# Patient Record
Sex: FEMALE | Race: WHITE | NOT HISPANIC OR LATINO | Employment: UNEMPLOYED | URBAN - METROPOLITAN AREA
[De-identification: names, ages, dates, MRNs, and addresses within clinical notes are randomized per-mention and may not be internally consistent; named-entity substitution may affect disease eponyms.]

---

## 2017-02-15 ENCOUNTER — TRANSFERRED RECORDS (OUTPATIENT)
Dept: HEALTH INFORMATION MANAGEMENT | Facility: CLINIC | Age: 52
End: 2017-02-15

## 2017-03-07 ENCOUNTER — ALLIED HEALTH/NURSE VISIT (OUTPATIENT)
Dept: FAMILY MEDICINE | Facility: CLINIC | Age: 52
End: 2017-03-07
Payer: MEDICARE

## 2017-03-07 ENCOUNTER — TELEPHONE (OUTPATIENT)
Dept: FAMILY MEDICINE | Facility: CLINIC | Age: 52
End: 2017-03-07

## 2017-03-07 DIAGNOSIS — Z23 NEED FOR PROPHYLACTIC VACCINATION AND INOCULATION AGAINST INFLUENZA: Primary | ICD-10-CM

## 2017-03-07 PROCEDURE — G0008 ADMIN INFLUENZA VIRUS VAC: HCPCS

## 2017-03-07 PROCEDURE — 99207 ZZC NO CHARGE NURSE ONLY: CPT

## 2017-03-07 PROCEDURE — 90686 IIV4 VACC NO PRSV 0.5 ML IM: CPT

## 2017-03-07 NOTE — MR AVS SNAPSHOT
"              After Visit Summary   3/7/2017    Malinda Walker    MRN: [id number]           Patient Information     Date Of Birth          1965        Visit Information        Provider Department      3/7/2017 4:15 PM [place]  CMA/LPN [facility]        Today's Diagnoses     Need for prophylactic vaccination and inoculation against influenza    -  1       Follow-ups after your visit        Your next 10 appointments already scheduled     Mar 07, 2017  4:15 PM CST   Nurse Only with [facility] C[facility]/LPN   [facility]    [address] [address] [phone]              Who to contact     Normal or non-critical lab and imaging results will be communicated to you by MyChart, letter or phone within 4 business days after the clinic has received the results. If you do not hear from us within 7 days, please contact the clinic through MyChart or phone. If you have a critical or abnormal lab result, we will notify you by phone as soon as possible.  Submit refill requests through My Health Direct or call your pharmacy and they will forward the refill request to us. Please allow 3 business days for your refill to be completed.          If you need to speak with a  for additional information , please call: [phone]             Additional Information About Your Visit        My Health Direct Information     My Health Direct lets you send messages to your doctor, view your test results, renew your prescriptions, schedule appointments and more. To sign up, go to [email or web address] . Click on \"Log in\" on the left side of the screen, which will take you to the Welcome page. Then click on \"Sign up Now\" on the right side of the page.     You will be asked to enter the access code listed below, as well as some personal information. Please follow the directions to create your username and password.     Your access code is: [email or web address]  Expires: 6/5/2017  4:14 PM     Your access " code will  in 90 days. If you need help or a new code, please call your [place] clinic or [phone].        Care EveryWhere ID     This is your Care EveryWhere ID. This could be used by other organizations to access your [place] medical records  [id number]         Blood Pressure from Last 3 Encounters:   10/27/16 116/78   16 131/72   16 122/76    Weight from Last 3 Encounters:   10/27/16 87 lb (39.5 kg)   16 87 lb 1.6 oz (39.5 kg)   16 86 lb 8 oz (39.2 kg)              We Performed the Following     ADMIN INFLUENZA[] (For MEDICARE Patients ONLY)      FLU VAC, SPLIT VIRUS IM > 3 YO (QUADRIVALENT) [[id number]]        Primary Care Provider Office Phone # Fax #    Salvador Gabriel [phone]       [facility] [address] [facility] [address]        Thank you!     Thank you for choosing [facility]  for your care. Our goal is always to provide you with excellent care. Hearing back from our patients is one way we can continue to improve our services. Please take a few minutes to complete the written survey that you may receive in the mail after your visit with us. Thank you!             Your Updated Medication List - Protect others around you: Learn how to safely use, store and throw away your medicines at www.disposemymeds.org.      Notice  As of 3/7/2017  4:14 PM    You have not been prescribed any medications.

## 2017-03-07 NOTE — PROGRESS NOTES
Injectable Influenza Immunization Documentation    1.  Is the person to be vaccinated sick today?  No    2. Does the person to be vaccinated have an allergy to eggs or to a component of the vaccine?  No    3. Has the person to be vaccinated today ever had a serious reaction to influenza vaccine in the past?  No    4. Has the person to be vaccinated ever had Guillain-Edgemoor syndrome?  No     Form completed by Agnes Espino CMA

## 2017-05-24 ENCOUNTER — TELEPHONE (OUTPATIENT)
Dept: FAMILY MEDICINE | Facility: CLINIC | Age: 52
End: 2017-05-24

## 2017-05-24 NOTE — TELEPHONE ENCOUNTER
Called June Juares to discuss colon cancer screening with FIT test, it was discussed that colonoscopy was not desired, I assured her that colonoscopy would not be necessary, but she would receive a letter in the mail with the FIT test with an explanation of how to send a stool sample back to clinic.    Patient seen and note scribed by Washington Castrejon MS3

## 2017-05-25 ENCOUNTER — TELEPHONE (OUTPATIENT)
Dept: FAMILY MEDICINE | Facility: CLINIC | Age: 52
End: 2017-05-25

## 2017-05-25 DIAGNOSIS — Z12.11 SPECIAL SCREENING FOR MALIGNANT NEOPLASMS, COLON: Primary | ICD-10-CM

## 2017-07-15 ENCOUNTER — HEALTH MAINTENANCE LETTER (OUTPATIENT)
Age: 52
End: 2017-07-15

## 2017-08-04 ENCOUNTER — OFFICE VISIT (OUTPATIENT)
Dept: FAMILY MEDICINE | Facility: CLINIC | Age: 52
End: 2017-08-04
Payer: MEDICARE

## 2017-08-04 VITALS
HEART RATE: 85 BPM | HEIGHT: 55 IN | SYSTOLIC BLOOD PRESSURE: 119 MMHG | BODY MASS INDEX: 17.4 KG/M2 | TEMPERATURE: 97.2 F | WEIGHT: 75.2 LBS | DIASTOLIC BLOOD PRESSURE: 74 MMHG

## 2017-08-04 DIAGNOSIS — R63.4 LOSS OF WEIGHT: ICD-10-CM

## 2017-08-04 DIAGNOSIS — D64.9 ANEMIA, UNSPECIFIED TYPE: ICD-10-CM

## 2017-08-04 DIAGNOSIS — R63.6 LOW WEIGHT FOR HEIGHT: ICD-10-CM

## 2017-08-04 DIAGNOSIS — Q90.9 DOWN'S SYNDROME: Primary | ICD-10-CM

## 2017-08-04 LAB
ALBUMIN SERPL-MCNC: 3.5 G/DL (ref 3.4–5)
ALP SERPL-CCNC: 82 U/L (ref 40–150)
ALT SERPL W P-5'-P-CCNC: 24 U/L (ref 0–50)
ANION GAP SERPL CALCULATED.3IONS-SCNC: 5 MMOL/L (ref 3–14)
AST SERPL W P-5'-P-CCNC: 36 U/L (ref 0–45)
BASOPHILS # BLD AUTO: 0 10E9/L (ref 0–0.2)
BASOPHILS NFR BLD AUTO: 0.5 %
BILIRUB SERPL-MCNC: 0.3 MG/DL (ref 0.2–1.3)
BUN SERPL-MCNC: 17 MG/DL (ref 7–30)
CALCIUM SERPL-MCNC: 8.8 MG/DL (ref 8.5–10.1)
CHLORIDE SERPL-SCNC: 103 MMOL/L (ref 94–109)
CO2 SERPL-SCNC: 29 MMOL/L (ref 20–32)
CREAT SERPL-MCNC: 0.71 MG/DL (ref 0.52–1.04)
DIFFERENTIAL METHOD BLD: NORMAL
EOSINOPHIL # BLD AUTO: 0.1 10E9/L (ref 0–0.7)
EOSINOPHIL NFR BLD AUTO: 1.2 %
ERYTHROCYTE [DISTWIDTH] IN BLOOD BY AUTOMATED COUNT: 14.2 % (ref 10–15)
GFR SERPL CREATININE-BSD FRML MDRD: 87 ML/MIN/1.7M2
GLUCOSE SERPL-MCNC: 77 MG/DL (ref 70–99)
HCT VFR BLD AUTO: 42.4 % (ref 35–47)
HGB BLD-MCNC: 14.2 G/DL (ref 11.7–15.7)
IRON SATN MFR SERPL: 20 % (ref 15–46)
IRON SERPL-MCNC: 40 UG/DL (ref 35–180)
LYMPHOCYTES # BLD AUTO: 1.4 10E9/L (ref 0.8–5.3)
LYMPHOCYTES NFR BLD AUTO: 16.6 %
MCH RBC QN AUTO: 32.6 PG (ref 26.5–33)
MCHC RBC AUTO-ENTMCNC: 33.5 G/DL (ref 31.5–36.5)
MCV RBC AUTO: 97 FL (ref 78–100)
MONOCYTES # BLD AUTO: 0.5 10E9/L (ref 0–1.3)
MONOCYTES NFR BLD AUTO: 6.1 %
NEUTROPHILS # BLD AUTO: 6.3 10E9/L (ref 1.6–8.3)
NEUTROPHILS NFR BLD AUTO: 75.6 %
PLATELET # BLD AUTO: 238 10E9/L (ref 150–450)
POTASSIUM SERPL-SCNC: 4.1 MMOL/L (ref 3.4–5.3)
PROT SERPL-MCNC: 7.9 G/DL (ref 6.8–8.8)
RBC # BLD AUTO: 4.36 10E12/L (ref 3.8–5.2)
SODIUM SERPL-SCNC: 137 MMOL/L (ref 133–144)
TIBC SERPL-MCNC: 201 UG/DL (ref 240–430)
TSH SERPL DL<=0.005 MIU/L-ACNC: 2.23 MU/L (ref 0.4–4)
WBC # BLD AUTO: 8.4 10E9/L (ref 4–11)

## 2017-08-04 PROCEDURE — 80053 COMPREHEN METABOLIC PANEL: CPT | Performed by: FAMILY MEDICINE

## 2017-08-04 PROCEDURE — 83540 ASSAY OF IRON: CPT | Performed by: FAMILY MEDICINE

## 2017-08-04 PROCEDURE — 83550 IRON BINDING TEST: CPT | Performed by: FAMILY MEDICINE

## 2017-08-04 PROCEDURE — 00000402 ZZHCL STATISTIC TOTAL PROTEIN: Performed by: FAMILY MEDICINE

## 2017-08-04 PROCEDURE — 84443 ASSAY THYROID STIM HORMONE: CPT | Performed by: FAMILY MEDICINE

## 2017-08-04 PROCEDURE — 99213 OFFICE O/P EST LOW 20 MIN: CPT | Performed by: FAMILY MEDICINE

## 2017-08-04 PROCEDURE — 80050 GENERAL HEALTH PANEL: CPT | Performed by: FAMILY MEDICINE

## 2017-08-04 PROCEDURE — 84165 PROTEIN E-PHORESIS SERUM: CPT | Performed by: FAMILY MEDICINE

## 2017-08-04 PROCEDURE — 36415 COLL VENOUS BLD VENIPUNCTURE: CPT | Performed by: FAMILY MEDICINE

## 2017-08-04 NOTE — NURSING NOTE
"Chief Complaint   Patient presents with     Weight Loss       Initial /74 (BP Location: Right arm, Patient Position: Sitting, Cuff Size: Adult Small)  Pulse 85  Temp 97.2  F (36.2  C) (Tympanic)  Ht 4' 4\" (1.321 m)  Wt 75 lb 3.2 oz (34.1 kg)  BMI 19.55 kg/m2 Estimated body mass index is 19.55 kg/(m^2) as calculated from the following:    Height as of this encounter: 4' 4\" (1.321 m).    Weight as of this encounter: 75 lb 3.2 oz (34.1 kg).  Medication Reconciliation: complete   Sigrid Lees LPN    "

## 2017-08-04 NOTE — PROGRESS NOTES
SUBJECTIVE:                                                    Malinda Walker is a 51 year old female who presents to clinic today for the following health issues:    Has been loosing weight since she had pneumonia this spring.  Foster mom reports she does not eat her luch all the time.  Eats breakfast and dinner and bedtime snack well.  She has cut out dressing and kepchup with high fructose syrup in theoir diet.      Has not niticed an y cahnge in apitie or energy,  But according to scale wweight is decreasing  Review of chart shows low albumin when she was hospitalized.    She has also has anemia.    She is missing 3 teeth  Upper left.  Does not eat much crispy foods.    No constipation has daily loose stool.  No change no bloating.  Happy no change in behaviors.   Enjoys outings,   Not more sob, no decrease in endurance.       Wt Readings from Last 10 Encounters:   08/04/17 75 lb 3.2 oz (34.1 kg)   10/27/16 87 lb (39.5 kg)   08/29/16 87 lb 1.6 oz (39.5 kg)   04/28/16 86 lb 8 oz (39.2 kg)   03/30/16 87 lb 12.8 oz (39.8 kg)   03/23/16 100 lb 8.5 oz (45.6 kg)   08/25/15 94 lb (42.6 kg)   08/14/14 93 lb 8 oz (42.4 kg)   08/13/13 93 lb (42.2 kg)   08/01/12 97 lb 8 oz (44.2 kg)       Problem list and histories reviewed & adjusted, as indicated.  Additional history: as documented    Patient Active Problem List   Diagnosis     Down's syndrome     CARDIOVASCULAR SCREENING; LDL GOAL LESS THAN 160     Health Care Home     Routine general medical examination at a health care facility     Physician defers pap smear of cervix     Pneumonia     Acute hypoxemic respiratory failure (H)     Past Surgical History:   Procedure Laterality Date     NO HISTORY OF SURGERY         Social History   Substance Use Topics     Smoking status: Never Smoker     Smokeless tobacco: Never Used     Alcohol use No     History reviewed. No pertinent family history.          Reviewed and updated as needed this visit by clinical staff      "  Reviewed and updated as needed this visit by Provider         ROS: limted by cognitive and communcation delay  Constitutional, HEENT, cardiovascular, pulmonary, gi and gu systems are negative, except as otherwise noted.      OBJECTIVE:   /74 (BP Location: Right arm, Patient Position: Sitting, Cuff Size: Adult Small)  Pulse 85  Temp 97.2  F (36.2  C) (Tympanic)  Ht 4' 4\" (1.321 m)  Wt 75 lb 3.2 oz (34.1 kg)  BMI 19.55 kg/m2  Body mass index is 19.55 kg/(m^2).  GENERAL: healthy, alert and no distress  NECK: no adenopathy, no asymmetry, masses, or scars and thyroid normal to palpation  RESP: lungs clear to auscultation - no rales, rhonchi or wheezes  CV: regular rate and rhythm, normal S1 S2, no S3 or S4, no murmur, click or rub, no peripheral edema and peripheral pulses strong  ABDOMEN: soft, nontender, no hepatosplenomegaly, no masses and bowel sounds normal  MS: no gross musculoskeletal defects noted, no edema    Diagnostic Test Results:  none     ASSESSMENT/PLAN:       1. Down's syndrome - Doing well        2. Low weight for height    - CBC with platelets differential  - Comprehensive metabolic panel (BMP + Alb, Alk Phos, ALT, AST, Total. Bili, TP)  - Protein electrophoresis    3. Anemia, unspecified type    - Iron and iron binding capacity     4. Loss of weight  - discussed with foster mom.  She states Malinda like scrambled eggs and cheese not as much meats.  Like peanut butter, favorite food is any type of candy    She will offer more eggs and cheese and monitor percent of meals eaten.  She will also ask \"work\"  To monitor how much of her lunch she eats.     - TSH with free T4 reflex    Work on weight loss, monitor inake   recheck weight  Weight management plan pre-albumin level obtained, nutritionist consultation and return visit in 1 Month    Salvador Gabriel MD  [facility]"

## 2017-08-07 LAB
ALBUMIN SERPL ELPH-MCNC: 4 G/DL (ref 3.7–5.1)
ALPHA1 GLOB SERPL ELPH-MCNC: 0.3 G/DL (ref 0.2–0.4)
ALPHA2 GLOB SERPL ELPH-MCNC: 0.8 G/DL (ref 0.5–0.9)
B-GLOBULIN SERPL ELPH-MCNC: 0.7 G/DL (ref 0.6–1)
GAMMA GLOB SERPL ELPH-MCNC: 1.7 G/DL (ref 0.7–1.6)
M PROTEIN SERPL ELPH-MCNC: 0 G/DL
PROT PATTERN SERPL ELPH-IMP: ABNORMAL

## 2017-08-09 ENCOUNTER — CARE COORDINATION (OUTPATIENT)
Dept: CARE COORDINATION | Facility: CLINIC | Age: 52
End: 2017-08-09

## 2017-08-09 NOTE — PROGRESS NOTES
Clinic Care Coordination Contact  OUTREACH    Referral Information:  Referral Source: Care Team  Reason for Contact: Jaye Cowan, [facility], [phone], called the clinic and requested pt's most recent office visit notes.  This writer became involved when there was no Consent to Communicate or Legal documentation on file for the the pt.      [place] Utilization:   ED Visits in last year: 0  Hospital visits in last year: 0  Last PCP appointment: 08/04/17  Missed Appointments: 0  Concerns: yes  Multiple Providers or Specialists: no    Clinical Concerns:  Current Medical Concerns: Pt with 12 pound weight loss.  Jaye to call MD Gabriel to address this issue, as she will be filing an adult protection report due to 12 weight loss in 9 months.  Per Jaye, she is concerned with pt's cares & feeding at her foster home.  Current Behavioral Concerns: NA    Education Provided to patient: SOCORRO educated Jaye on what paperwork is needed for the [facility] to be able to release medical information not only to her but to the Foster parents as well.  Jaye will bring to the [facility] the patient's Guardian paperwork, [facility] paperwork and documentation that the June & J Carlos Juares are the pt's foster parents.  Once this paperwork is received, the office visit documentation may be given to Jaye.     Clinical Pathway: None    Medication Management:  Per Jaye, medications are set up and given by June, pt's foster mom.     Functional Status:  Mobility Status: Independent  Transportation: by pt's foster family      Psychosocial:  Current living arrangement:: In the care of the [facility] since 2001  Financial/Insurance: SSD[place]/ [place] & [place]       Resources and Interventions:  Current Resources:  Jaye Cowan [facility] Worker  will file an APS report based on pt's weight loss        Goals: NA  Barriers: NA  Strengths: NA    Patient/Caregiver  understanding: Jaye to provide [facility] with necessary legal documents requested     Upcoming appointment: 08/30/17     Plan: SOCORRO to remain available.

## 2017-09-08 ENCOUNTER — TELEPHONE (OUTPATIENT)
Dept: FAMILY MEDICINE | Facility: CLINIC | Age: 52
End: 2017-09-08

## 2017-09-08 NOTE — LETTER
November 8, 2017      Malinda Walker  [address] [address]        Dear Malinda,     As part of [place]'s commitment to health and wellness we have recently reviewed your chart and your medical record indicates that you are due for one or more of the following:    -- Mammogram. Mammogram not on file. If you have had one outside of [place] please call to let us know so that we can update your chart.  Please call one of the following numbers to schedule:  [facility] [phone]  [facility] [phone]  [facility] [phone]  [facility] [phone]  [facility] [phone]  [facility] [phone]    Please try to schedule and/or complete the tests above within the next 2-4 weeks.   The number to call to schedule an appointment at [facility] is [phone].    While we work hard to maintain accurate records on all our patients, it is always possible that this notice does not accurately reflect tests that you may have had. To ensure that we do not continue to send you notices please verify, at your next visit or by a [employer]ors[employer] message, that we have accurate dates of your tests, even if these were done many years ago.     Working together for your health is our goal.    Thank you for choosing [place] for your healthcare needs.      Sincerely,     Salvador Gabriel MD/  [facility] Care Team

## 2017-09-11 ENCOUNTER — OFFICE VISIT (OUTPATIENT)
Dept: FAMILY MEDICINE | Facility: CLINIC | Age: 52
End: 2017-09-11
Payer: MEDICARE

## 2017-09-11 VITALS
TEMPERATURE: 97.3 F | SYSTOLIC BLOOD PRESSURE: 110 MMHG | HEIGHT: 55 IN | BODY MASS INDEX: 17.63 KG/M2 | WEIGHT: 76.2 LBS | HEART RATE: 65 BPM | DIASTOLIC BLOOD PRESSURE: 71 MMHG

## 2017-09-11 DIAGNOSIS — R63.6 LOW WEIGHT: ICD-10-CM

## 2017-09-11 DIAGNOSIS — Q90.9 DOWN'S SYNDROME: ICD-10-CM

## 2017-09-11 DIAGNOSIS — Z13.6 CARDIOVASCULAR SCREENING; LDL GOAL LESS THAN 160: ICD-10-CM

## 2017-09-11 DIAGNOSIS — J96.01 ACUTE HYPOXEMIC RESPIRATORY FAILURE (H): ICD-10-CM

## 2017-09-11 DIAGNOSIS — Z00.00 ROUTINE GENERAL MEDICAL EXAMINATION AT A HEALTH CARE FACILITY: Primary | ICD-10-CM

## 2017-09-11 LAB — CHOLEST SERPL-MCNC: 204 MG/DL

## 2017-09-11 PROCEDURE — 82465 ASSAY BLD/SERUM CHOLESTEROL: CPT | Performed by: FAMILY MEDICINE

## 2017-09-11 PROCEDURE — G0438 PPPS, INITIAL VISIT: HCPCS | Performed by: FAMILY MEDICINE

## 2017-09-11 PROCEDURE — 36415 COLL VENOUS BLD VENIPUNCTURE: CPT | Performed by: FAMILY MEDICINE

## 2017-09-11 NOTE — PROGRESS NOTES
SUBJECTIVE:   Malinda Walker is a 52 year old female who presents for Preventive Visit    Are you in the first 12 months of your Medicare Part B coverage?  No    Healthy Habits:    Do you get at least three servings of calcium containing foods daily (dairy, green leafy vegetables, etc.)? yes    Amount of exercise or daily activities, outside of work: none    Problems taking medications regularly No    Medication side effects: No    Have you had an eye exam in the past two years? yes    Do you see a dentist twice per year? No, once a year    Do you have sleep apnea, excessive snoring or daytime drowsiness?no        Wt Readings from Last 10 Encounters:   09/11/17 76 lb 3.2 oz (34.6 kg)   08/04/17 75 lb 3.2 oz (34.1 kg)   10/27/16 87 lb (39.5 kg)   08/29/16 87 lb 1.6 oz (39.5 kg)   04/28/16 86 lb 8 oz (39.2 kg)   03/30/16 87 lb 12.8 oz (39.8 kg)   03/23/16 100 lb 8.5 oz (45.6 kg)   08/25/15 94 lb (42.6 kg)   08/14/14 93 lb 8 oz (42.4 kg)   08/13/13 93 lb (42.2 kg)     Patient informed that anything we discuss that is not related to preventative medicine, may be billed for; patient verbalizes understanding.      Reviewed and updated as needed this visit by clinical staff  Tobacco  Allergies  Med Hx  Surg Hx  Fam Hx  Soc Hx        Reviewed and updated as needed this visit by Provider        Social History   Substance Use Topics     Smoking status: Never Smoker     Smokeless tobacco: Never Used     Alcohol use No       The patient does not drink >3 drinks per day nor >7 drinks per week.    Today's PHQ-2 Score:   PHQ-2 ( 1999 Pf[employer]) 8/29/2016 8/25/2015   Q1: Little interest or pleasure in doing things 0 0   Q2: Feeling down, depressed or hopeless 0 0   PHQ-2 Score 0 0         Do you feel safe in your environment - Yes    Do you have a Health Care Directive?: No: Advance care planning reviewed with patient; information given to patient to review.    Current providers sharing in care for this patient include:  "Patient Care Team:  Salvador Gabriel MD as PCP - General      Hearing impairment: No    Ability to successfully perform activities of daily living: No, needs assistance with: personal care     Fall risk:  Fallen 2 or more times in the past year?: No  Any fall with injury in the past year?: No      Home safety:  none identified      The following health maintenance items are reviewed in Epic and correct as of today:  Health Maintenance   Topic Date Due     HEPATITIS C SCREENING  08/18/1982     HPV Q5 YEARS (Complete with PAP)  08/18/1994     LIPID MONITORING Q1 YEAR  08/14/2015     MAMMO Q1 YR  03/30/2017     MEDICARE ANNUAL WELLNESS VISIT  08/29/2017     INFLUENZA VACCINE (SYSTEM ASSIGNED)  09/01/2017     PAP Q5 YEARS  02/11/2021     TETANUS IMMUNIZATION (SYSTEM ASSIGNED)  07/14/2021     COLON CANCER SCREEN (SYSTEM ASSIGNED)  03/30/2026       ROS:  Constitutional, HEENT, cardiovascular, pulmonary, gi and gu systems are negative, except as otherwise noted.      OBJECTIVE:   /71 (BP Location: Right arm, Patient Position: Sitting, Cuff Size: Adult Small)  Pulse 65  Ht 4' 4.25\" (1.327 m)  Wt 76 lb 3.2 oz (34.6 kg)  BMI 19.62 kg/m2 Estimated body mass index is 19.62 kg/(m^2) as calculated from the following:    Height as of this encounter: 4' 4.25\" (1.327 m).    Weight as of this encounter: 76 lb 3.2 oz (34.6 kg).  EXAM:   GENERAL: healthy, alert and no distress  NECK: no adenopathy, no asymmetry, masses, or scars and thyroid normal to palpation  RESP: lungs clear to auscultation - no rales, rhonchi or wheezes  CV: regular rate and rhythm, normal S1 S2, no S3 or S4, no murmur, click or rub, no peripheral edema and peripheral pulses strong  ABDOMEN: soft, nontender, no hepatosplenomegaly, no masses and bowel sounds normal  MS: no gross musculoskeletal defects noted, no edema    ASSESSMENT / PLAN:     (Z00.00) Routine general medical examination at a health care facility  (primary encounter diagnosis)  Doing well " "weight loss was concerning but is being monitored closely    (Z13.6) CARDIOVASCULAR SCREENING; LDL GOAL LESS THAN 160  Plan: Cholesterol          (R63.6) Low weight albumin., serum protein normal.  Anemia has resolved.  She was quite sick ealier this year and had significant weight change at that time.  I think the listed 100lbs was not acurate,  Her baseline weight has been around 93.   14 lb loss is concerning trending upwards.  She is quit poky is eatting meals,  especially at \"work\"  Will have nutrition consult and recheck weight in 1 month.  Plan: NUTRITION REFERRAL            (Q90.9) Down's syndrome  Appears she may be having slight additional early cognitive decline.  Foster mom does not feel Malinda has required increased cares other than being distracted at meal times.     (J96.01) Acute hypoxemic respiratory failure (H)  Has resolved.      End of Life Planning:  Patient currently has an advanced directive: she is not competant to make advance planning.      COUNSELING:  Reviewed preventive health counseling, as reflected in patient instructions       Regular exercise       Healthy diet/nutrition        Estimated body mass index is 19.62 kg/(m^2) as calculated from the following:    Height as of this encounter: 4' 4.25\" (1.327 m).    Weight as of this encounter: 76 lb 3.2 oz (34.6 kg).  Weight management plan nutritionist consultation   reports that she has never smoked. She has never used smokeless tobacco.        Appropriate preventive services were discussed with this patient, including applicable screening as appropriate for cardiovascular disease, diabetes, osteopenia/osteoporosis, and glaucoma.  As appropriate for age/gender, discussed screening for colorectal cancer, prostate cancer, breast cancer, and cervical cancer. Checklist reviewing preventive services available has been given to the patient.    Reviewed patients plan of care and provided an AVS. The Complex Care Plan (for patients with higher " acuity and needing more deliberate coordination of services) for Malinda meets the Care Plan requirement. This Care Plan has been established and reviewed with the caregiver.    Counseling Resources:  ATP IV Guidelines  Pooled Cohorts Equation Calculator  Breast Cancer Risk Calculator  FRAX Risk Assessment  ICSI Preventive Guidelines  Dietary Guidelines for Americans, 2010  USDA's MyPlate  ASA Prophylaxis  Lung CA Screening    Salvador Gabriel MD  [facility]

## 2017-09-11 NOTE — MR AVS SNAPSHOT
After Visit Summary   9/11/2017    Malinda Walker    MRN: [id number]           Patient Information     Date Of Birth          1965        Visit Information        Provider Department      9/11/2017 9:20 AM Salvador Gabriel MD [facility]        Today's Diagnoses     CARDIOVASCULAR SCREENING; LDL GOAL LESS THAN 160    -  1    Low weight          Care Instructions      Preventive Health Recommendations    Female Ages 65 +    Yearly exam:     See your health care provider every year in order to  o Review health changes.   o Discuss preventive care.    o Review your medicines if your doctor has prescribed any.      You no longer need a yearly Pap test unless you've had an abnormal Pap test in the past 10 years. If you have vaginal symptoms, such as bleeding or discharge, be sure to talk with your provider about a Pap test.      Every 1 to 2 years, have a mammogram.  If you are over 69, talk with your health care provider about whether or not you want to continue having screening mammograms.      Every 10 years, have a colonoscopy. Or, have a yearly FIT test (stool test). These exams will check for colon cancer.       Have a cholesterol test every 5 years, or more often if your doctor advises it.       Have a diabetes test (fasting glucose) every three years. If you are at risk for diabetes, you should have this test more often.       At age 65, have a bone density scan (DEXA) to check for osteoporosis (brittle bone disease).    Shots:    Get a flu shot each year.    Get a tetanus shot every 10 years.    Talk to your doctor about your pneumonia vaccines. There are now two you should receive - Pneumovax (PPSV 23) and Prevnar (PCV 13).    Talk to your doctor about the shingles vaccine.    Talk to your doctor about the hepatitis B vaccine.    Nutrition:     Eat at least 5 servings of fruits and vegetables each day.      Eat whole-grain bread, whole-wheat pasta and brown rice instead of white  grains and rice.      Talk to your provider about Calcium and Vitamin D.     Lifestyle    Exercise at least 150 minutes a week (30 minutes a day, 5 days a week). This will help you control your weight and prevent disease.      Limit alcohol to one drink per day.      No smoking.       Wear sunscreen to prevent skin cancer.       See your dentist twice a year for an exam and cleaning.      See your eye doctor every 1 to 2 years to screen for conditions such as glaucoma, macular degeneration and cataracts.          Follow-ups after your visit        Additional Services     NUTRITION REFERRAL       Your provider has referred you to: [facility]: [facility] (on call location) - [place] [phone]   [email or web address]    Please be aware that coverage of these services is subject to the terms and limitations of your health insurance plan.  Call member services at your health plan with any benefit or coverage questions.      Please bring the following with you to your appointment:    (1) This referral request  (2) Any documents given to you regarding this referral  (3) Any specific questions you have about diet and/or food choices                  Who to contact     Normal or non-critical lab and imaging results will be communicated to you by MyChart, letter or phone within 4 business days after the clinic has received the results. If you do not hear from us within 7 days, please contact the clinic through 123peoplehart or phone. If you have a critical or abnormal lab result, we will notify you by phone as soon as possible.  Submit refill requests through BuddyBet or call your pharmacy and they will forward the refill request to us. Please allow 3 business days for your refill to be completed.          If you need to speak with a  for additional information , please call: [phone]             Additional Information About Your Visit        123peoplehart Information     BuddyBet  "lets you send messages to your doctor, view your test results, renew your prescriptions, schedule appointments and more. To sign up, go to [email or web address] . Click on \"Log in\" on the left side of the screen, which will take you to the Welcome page. Then click on \"Sign up Now\" on the right side of the page.     You will be asked to enter the access code listed below, as well as some personal information. Please follow the directions to create your username and password.     Your access code is: [email or web address]  Expires: 2017  9:08 AM     Your access code will  in 90 days. If you need help or a new code, please call your [facility] clinic or [phone].        Care EveryWhere ID     This is your Care EveryWhere ID. This could be used by other organizations to access your [facility] medical records  [id number]        Your Vitals Were     Pulse Temperature Height BMI (Body Mass Index)          65 97.3  F (36.3  C) (Tympanic) 4' 4.25\" (1.327 m) 19.62 kg/m2         Blood Pressure from Last 3 Encounters:   17 110/71   17 119/74   10/27/16 116/78    Weight from Last 3 Encounters:   17 76 lb 3.2 oz (34.6 kg)   17 75 lb 3.2 oz (34.1 kg)   10/27/16 87 lb (39.5 kg)              We Performed the Following     Cholesterol     NUTRITION REFERRAL        Primary Care Provider Office Phone # Fax #    Salvador Gabriel [phone]       [facility] [address] [address]        Equal Access to Services     DEVORA MONTENEGRO AH: Hadii tomas holley Soyue, waaxda luqadaha, qaybta kaalmada sarah, roshni pavon. So [facility] [phone].    ATENCIÓN: Si habla español, tiene a culp disposición servicios gratuitos de asistencia lingüística. Llame al [phone].    We comply with applicable federal civil rights laws and [place] laws. We do not discriminate on the basis of race, color, national origin, age, disability sex, sexual " orientation or gender identity.            Thank you!     Thank you for choosing [facility]  for your care. Our goal is always to provide you with excellent care. Hearing back from our patients is one way we can continue to improve our services. Please take a few minutes to complete the written survey that you may receive in the mail after your visit with us. Thank you!             Your Updated Medication List - Protect others around you: Learn how to safely use, store and throw away your medicines at www.disposemymeds.org.      Notice  As of 9/11/2017  9:37 AM    You have not been prescribed any medications.

## 2017-09-11 NOTE — NURSING NOTE
"Chief Complaint   Patient presents with     Medicare Visit       Initial /71 (BP Location: Right arm, Patient Position: Sitting, Cuff Size: Adult Small)  Pulse 65  Ht 4' 4.25\" (1.327 m)  Wt 76 lb 3.2 oz (34.6 kg)  BMI 19.62 kg/m2 Estimated body mass index is 19.62 kg/(m^2) as calculated from the following:    Height as of this encounter: 4' 4.25\" (1.327 m).    Weight as of this encounter: 76 lb 3.2 oz (34.6 kg).  Medication Reconciliation: complete   Sasha Mcclain CMA  "

## 2017-10-10 ENCOUNTER — ALLIED HEALTH/NURSE VISIT (OUTPATIENT)
Dept: FAMILY MEDICINE | Facility: CLINIC | Age: 52
End: 2017-10-10
Payer: MEDICARE

## 2017-10-10 VITALS — WEIGHT: 77.1 LBS | BODY MASS INDEX: 19.86 KG/M2

## 2017-10-10 DIAGNOSIS — R63.6 UNDERWEIGHT: Primary | ICD-10-CM

## 2017-10-10 PROCEDURE — 99207 ZZC NO CHARGE NURSE ONLY: CPT

## 2017-10-10 NOTE — MR AVS SNAPSHOT
"              After Visit Summary   10/10/2017    Malinda Walker    MRN: [id number]           Patient Information     Date Of Birth          1965        Visit Information        Provider Department      10/10/2017 10:00 AM FL HU CMA/LPN [facility]        Today's Diagnoses     Underweight    -  1       Follow-ups after your visit        Your next 10 appointments already scheduled     Oct 10, 2017 10:00 AM CDT   Nurse Only with [place]  CMA/LPN   [facility]    [address] [id number] [phone]            Nov 07, 2017 10:00 AM CST   Nurse Only with [place] HU CMA/LPN   [facility]    [address] [id number] [phone]              Who to contact     Normal or non-critical lab and imaging results will be communicated to you by Arkmicrohart, letter or phone within 4 business days after the clinic has received the results. If you do not hear from us within 7 days, please contact the clinic through Arkmicrohart or phone. If you have a critical or abnormal lab result, we will notify you by phone as soon as possible.  Submit refill requests through Pocket Video or call your pharmacy and they will forward the refill request to us. Please allow 3 business days for your refill to be completed.          If you need to speak with a  for additional information , please call: [phone]             Additional Information About Your Visit        Pocket Video Information     Pocket Video lets you send messages to your doctor, view your test results, renew your prescriptions, schedule appointments and more. To sign up, go to [email or web address] . Click on \"Log in\" on the left side of the screen, which will take you to the Welcome page. Then click on \"Sign up Now\" on the right side of the page.     You will be asked to enter the access code listed below, as well as some personal information. Please follow the " directions to create your username and password.     Your access code is: [email or web address]  Expires: 2017  9:08 AM     Your access code will  in 90 days. If you need help or a new code, please call your [facility] clinic or [phone].        Care EveryWhere ID     This is your Care EveryWhere ID. This could be used by other organizations to access your [facility] medical records  [id number]        Your Vitals Were     BMI (Body Mass Index)                   19.86 kg/m2            Blood Pressure from Last 3 Encounters:   17 110/71   17 119/74   10/27/16 116/78    Weight from Last 3 Encounters:   10/10/17 77 lb 1.6 oz (35 kg)   17 76 lb 3.2 oz (34.6 kg)   17 75 lb 3.2 oz (34.1 kg)              Today, you had the following     No orders found for display       Primary Care Provider Office Phone # Fax #    Salvador Gabriel [phone]       [facility] [address] [address]        Equal Access to Services     JENNIFER MONTENEGRO AH: Hadii aad ku hadasho Soomaali, waaxda luqadaha, qaybta kaalmada adeegyada, roshni joyner haychanan latasha liu . So [facility] [phone].    ATENCIÓN: Si habla español, tiene a culp disposición servicios gratuitos de asistencia lingüística. Llame al [phone].    We comply with applicable federal civil rights laws and [place] laws. We do not discriminate on the basis of race, color, national origin, age, disability, sex, sexual orientation, or gender identity.            Thank you!     Thank you for choosing [facility]  for your care. Our goal is always to provide you with excellent care. Hearing back from our patients is one way we can continue to improve our services. Please take a few minutes to complete the written survey that you may receive in the mail after your visit with us. Thank you!             Your Updated Medication List - Protect others around you: Learn how to safely use, store and throw away your  medicines at www.disposemymeds.org.      Notice  As of 10/10/2017  8:57 AM    You have not been prescribed any medications.

## 2017-10-12 ENCOUNTER — CARE COORDINATION (OUTPATIENT)
Dept: CARE COORDINATION | Facility: CLINIC | Age: 52
End: 2017-10-12

## 2017-10-12 NOTE — PROGRESS NOTES
Clinic Care Coordination Contact    Situation: Patient chart reviewed by care coordinator.    Background: Pt is a guardian of the state, specifically, [facility] [employer] and resides in an Adult Foster Care home, which is run by [employer].  Documentation of this is now in pt's EMR in the media tab.  [facility] [employer]  initally concerned regarding pt's loss of weight and response of the foster parents to this issue.  PCP is involved and aware.    Assessment: NA    Plan/Recommendations: SW to continue to follow for 1 more month, or as needed.      Adilia Hyman  Social Work Care Coordinator  [facility] [place], [place] [facility]  [phone]

## 2017-10-18 ENCOUNTER — OFFICE VISIT (OUTPATIENT)
Dept: FAMILY MEDICINE | Facility: CLINIC | Age: 52
End: 2017-10-18
Payer: MEDICARE

## 2017-10-18 VITALS
DIASTOLIC BLOOD PRESSURE: 61 MMHG | TEMPERATURE: 97.1 F | HEART RATE: 44 BPM | HEIGHT: 55 IN | WEIGHT: 78.1 LBS | SYSTOLIC BLOOD PRESSURE: 113 MMHG | BODY MASS INDEX: 18.08 KG/M2 | OXYGEN SATURATION: 97 %

## 2017-10-18 DIAGNOSIS — J06.9 VIRAL UPPER RESPIRATORY TRACT INFECTION: Primary | ICD-10-CM

## 2017-10-18 DIAGNOSIS — R63.6 LOW WEIGHT: ICD-10-CM

## 2017-10-18 PROCEDURE — 99213 OFFICE O/P EST LOW 20 MIN: CPT | Performed by: FAMILY MEDICINE

## 2017-10-18 NOTE — PROGRESS NOTES
SUBJECTIVE:                                                    Malinda Walker is a 52 year old female who presents to clinic today for the following health issues:    ENT Symptoms             Symptoms: cc Present Absent Comment   Fever/Chills   x    Fatigue   x    Muscle Aches   x    Eye Irritation  x  Eyes were red and watery   Sneezing   x    Nasal Heri/Drg  x     Sinus Pressure/Pain   x    Loss of smell   x    Dental pain   x    Sore Throat   x    Swollen Glands   x    Ear Pain/Fullness   x    Cough  x  A little   Wheeze   x    Chest Pain   x    Shortness of breath   x    Rash   x    Other  x  Skin was pale     Symptom duration:  Monday evening   Symptom severity:  moderate   Treatments tried:  none   Contacts: Possibly but not sure.        Wt Readings from Last 10 Encounters:   10/18/17 78 lb 1.6 oz (35.4 kg)   10/10/17 77 lb 1.6 oz (35 kg)   09/11/17 76 lb 3.2 oz (34.6 kg)   08/04/17 75 lb 3.2 oz (34.1 kg)   10/27/16 87 lb (39.5 kg)   08/29/16 87 lb 1.6 oz (39.5 kg)   04/28/16 86 lb 8 oz (39.2 kg)   03/30/16 87 lb 12.8 oz (39.8 kg)   03/23/16 100 lb 8.5 oz (45.6 kg)   08/25/15 94 lb (42.6 kg)     Problem list and histories reviewed & adjusted, as indicated.  Additional history:     Patient Active Problem List   Diagnosis     Down's syndrome     CARDIOVASCULAR SCREENING; LDL GOAL LESS THAN 160     Health Care Home     Routine general medical examination at a health care facility     Acute hypoxemic respiratory failure (H)     Past Surgical History:   Procedure Laterality Date     NO HISTORY OF SURGERY         Social History   Substance Use Topics     Smoking status: Never Smoker     Smokeless tobacco: Never Used     Alcohol use No     History reviewed. No pertinent family history.          ROS:  Constitutional, HEENT, cardiovascular, pulmonary, gi and gu systems are negative, except as otherwise noted.      OBJECTIVE:                                                    /61 (BP Location: Right arm, Patient  "Position: Sitting, Cuff Size: Child)  Pulse (!) 44  Temp 97.1  F (36.2  C) (Tympanic)  Ht 4' 4.25\" (1.327 m)  Wt 78 lb 1.6 oz (35.4 kg)  SpO2 97%  BMI 20.11 kg/m2 Body mass index is 20.11 kg/(m^2).   GENERAL: healthy, alert, well nourished, well hydrated, no distress  HENT: ear canals- normal; TMs- normal; Nose- normal; Mouth- no ulcers, no lesions  NECK: no tenderness, no adenopathy, no asymmetry, no masses, no stiffness; thyroid- normal to palpation  RESP: lungs clear to auscultation - no rales, no rhonchi, no wheezes  CV: regular rates and rhythm, normal S1 S2, no S3 or S4 and no murmur, no click or rub -  ABDOMEN: soft, no tenderness, no  hepatosplenomegaly, no masses, normal bowel sounds       ASSESSMENT/PLAN:                                                    (J06.9,  B97.89) Viral upper respiratory tract infection  (primary encounter diagnosis)  Observe increse fluids     (R63.6) Low weight  Improving  She is doing better with supervision of lunch.     reports that she has never smoked. She has never used smokeless tobacco.        Weight management plan noted, stable and monitoring      [facility]  "

## 2017-10-18 NOTE — LETTER
[facility]  [address] [address]  [phone]        November 1, 2018    Malinda Walker  [address] [address]              Dear Malinda Walker    This is to remind you that your lab is due.    You may call our office at [phone] to schedule an appointment.    Please disregard this notice if you have already had your labs drawn or made an appointment.        Sincerely,        Salvador Gabriel MD

## 2017-10-18 NOTE — MR AVS SNAPSHOT
"              After Visit Summary   10/18/2017    Malinda Walker    MRN: [id number]           Patient Information     Date Of Birth          1965        Visit Information        Provider Department      10/18/2017 2:40 PM Salvador Gabriel MD [facility]        Care Instructions    Encourage fluids rest.  return to clinic or call if unimproved in 3-4 days sooner if worse.    Weight is up a pound.             Follow-ups after your visit        Your next 10 appointments already scheduled     Nov 07, 2017 10:00 AM CST   [facility]urse Only with [facility]   [facility]    [address] [address] [phone]              Who to contact     Normal or non-critical lab and imaging results will be communicated to you by OvaGene Oncologyhart, letter or phone within 4 business days after the clinic has received the results. If you do not hear from us within 7 days, please contact the clinic through Ova[employer] Oncologyhart or phone. If you have a critical or abnormal lab result, we will notify you by phone as soon as possible.  Submit refill requests through Anywhere to Go or call your pharmacy and they will forward the refill request to us. Please allow 3 business days for your refill to be completed.          If you need to speak with a  for additional information , please call: [phone]             Additional Information About Your Visit        Anywhere to Go Information     Anywhere to Go lets you send messages to your doctor, view your test results, renew your prescriptions, schedule appointments and more. To sign up, go to [email or web address] . Click on \"Log in\" on the left side of the screen, which will take you to the Welcome page. Then click on \"Sign up Now\" on the right side of the page.     You will be asked to enter the access code listed below, as well as some personal information. Please follow the directions to create your username and password.     Your access code is: " "[email or web address]  Expires: 2017  9:08 AM     Your access code will  in 90 days. If you need help or a new code, please call your [facility] clinic or [phone].        Care EveryWhere ID     This is your Care EveryWhere ID. This could be used by other organizations to access your [facility] medical records  [id number]        Your Vitals Were     Pulse Temperature Height Pulse Oximetry BMI (Body Mass Index)       44 97.1  F (36.2  C) (Tympanic) 4' 4.25\" (1.327 m) 97% 20.11 kg/m2        Blood Pressure from Last 3 Encounters:   10/18/17 113/61   17 110/71   17 119/74    Weight from Last 3 Encounters:   10/18/17 78 lb 1.6 oz (35.4 kg)   10/10/17 77 lb 1.6 oz (35 kg)   17 76 lb 3.2 oz (34.6 kg)              Today, you had the following     No orders found for display       Primary Care Provider Office Phone # Fax #    Salvador Gabriel [phone]       [facility] [address] [address] [facility] [address]        Equal Access to Services     DEVORA MONTENEGRO AH: Hadii aad ku hadasho Soomaali, waaxda luqadaha, qaybta kaalmada adeegyada, waxay idiin haychanan latasha apvon. So [facility] [phone].    ATENCIÓN: Si habla español, tiene a culp disposición servicios gratuitos de asistencia lingüística. Llame al [phone].    We comply with applicable federal civil rights laws and [place] laws. We do not discriminate on the basis of race, color, national origin, age, disability, sex, sexual orientation, or gender identity.            Thank you!     Thank you for choosing [facility]  for your care. Our goal is always to provide you with excellent care. Hearing back from our patients is one way we can continue to improve our services. Please take a few minutes to complete the written survey that you may receive in the mail after your visit with us. Thank you!             Your Updated Medication List - Protect others around you: Learn how to safely use, store and throw away " your medicines at www.disposemymeds.org.      Notice  As of 10/18/2017  3:01 PM    You have not been prescribed any medications.

## 2017-10-18 NOTE — PATIENT INSTRUCTIONS
Encourage fluids rest.  return to clinic or call if unimproved in 3-4 days sooner if worse.    Weight is up a pound.

## 2017-10-18 NOTE — NURSING NOTE
"Chief Complaint   Patient presents with     URI       Initial /61 (BP Location: Right arm, Patient Position: Sitting, Cuff Size: Child)  Pulse (!) 44  Temp 97.1  F (36.2  C) (Tympanic)  Ht 4' 4.25\" (1.327 m)  Wt 78 lb 1.6 oz (35.4 kg)  SpO2 97%  BMI 20.11 kg/m2 Estimated body mass index is 20.11 kg/(m^2) as calculated from the following:    Height as of this encounter: 4' 4.25\" (1.327 m).    Weight as of this encounter: 78 lb 1.6 oz (35.4 kg).  Medication Reconciliation: complete   Sasha Mcclain CMA    "

## 2017-10-26 PROBLEM — R63.6 LOW WEIGHT: Status: ACTIVE | Noted: 2017-10-26

## 2017-11-07 ENCOUNTER — ALLIED HEALTH/NURSE VISIT (OUTPATIENT)
Dept: FAMILY MEDICINE | Facility: CLINIC | Age: 52
End: 2017-11-07
Payer: MEDICARE

## 2017-11-07 VITALS — BODY MASS INDEX: 20.4 KG/M2 | WEIGHT: 79.2 LBS

## 2017-11-07 DIAGNOSIS — R63.6 LOW WEIGHT: Primary | ICD-10-CM

## 2017-11-07 PROCEDURE — 99207 ZZC NO CHARGE NURSE ONLY: CPT

## 2017-11-07 NOTE — MR AVS SNAPSHOT
"              After Visit Summary   2017    Malinda Walker    MRN: [id number]           Patient Information     Date Of Birth          1965        Visit Information        Provider Department      2017 10:00 AM [facility] CMA/LPN [facility]        Today's Diagnoses     Low weight    -  1       Follow-ups after your visit        Who to contact     Normal or non-critical lab and imaging results will be communicated to you by MyChart, letter or phone within 4 business days after the clinic has received the results. If you do not hear from us within 7 days, please contact the clinic through MyChart or phone. If you have a critical or abnormal lab result, we will notify you by phone as soon as possible.  Submit refill requests through [employer] or call your pharmacy and they will forward the refill request to us. Please allow 3 business days for your refill to be completed.          If you need to speak with a  for additional information , please call: [phone]             Additional Information About Your Visit        [employer] Information     [employer] lets you send messages to your doctor, view your test results, renew your prescriptions, schedule appointments and more. To sign up, go to [email or web address] . Click on \"Log in\" on the left side of the screen, which will take you to the Welcome page. Then click on \"Sign up Now\" on the right side of the page.     You will be asked to enter the access code listed below, as well as some personal information. Please follow the directions to create your username and password.     Your access code is: [email or web address]  Expires: 2018 12:18 PM     Your access code will  in 90 days. If you need help or a new code, please call your [place] clinic or [phone].        Care EveryWhere ID     This is your Care EveryWhere ID. This could be used by other organizations to access your [place] medical records  [id number]        Your " Vitals Were     BMI (Body Mass Index)                   20.4 kg/m2            Blood Pressure from Last 3 Encounters:   10/18/17 113/61   09/11/17 110/71   08/04/17 119/74    Weight from Last 3 Encounters:   11/07/17 79 lb 3.2 oz (35.9 kg)   10/18/17 78 lb 1.6 oz (35.4 kg)   10/10/17 77 lb 1.6 oz (35 kg)              Today, you had the following     No orders found for display       Primary Care Provider Office Phone # Fax #    Salvador Gabriel [phone]       [facility] [address] [facility] [address]        Equal Access to Services     DEVORA MONTENEGRO : Sloane Padilla, wamax willard, giota kaalmada sarah, roshni pavon. So [facility] [phone].    ATENCIÓN: Si habla español, tiene a culp disposición servicios gratuitos de asistencia lingüística. Llame al [phone].    We comply with applicable federal civil rights laws and [place] laws. We do not discriminate on the basis of race, color, national origin, age, disability, sex, sexual orientation, or gender identity.            Thank you!     Thank you for choosing [facility]  for your care. Our goal is always to provide you with excellent care. Hearing back from our patients is one way we can continue to improve our services. Please take a few minutes to complete the written survey that you may receive in the mail after your visit with us. Thank you!             Your Updated Medication List - Protect others around you: Learn how to safely use, store and throw away your medicines at www.disposemymeds.org.      Notice  As of 11/7/2017 12:18 PM    You have not been prescribed any medications.

## 2017-11-07 NOTE — PROGRESS NOTES
Patient her today for weight check.   Wt Readings from Last 5 Encounters:   11/07/17 79 lb 3.2 oz (35.9 kg)   10/18/17 78 lb 1.6 oz (35.4 kg)   10/10/17 77 lb 1.6 oz (35 kg)   09/11/17 76 lb 3.2 oz (34.6 kg)   08/04/17 75 lb 3.2 oz (34.1 kg)     Agnes Espino CMA

## 2017-11-07 NOTE — Clinical Note
Weight check Wt Readings from Last 4 Encounters: 11/07/17 : 79 lb 3.2 oz (35.9 kg) 10/18/17 : 78 lb 1.6 oz (35.4 kg) 10/10/17 : 77 lb 1.6 oz (35 kg) 09/11/17 : 76 lb 3.2 oz (34.6 kg)

## 2017-11-08 NOTE — TELEPHONE ENCOUNTER
Panel Management Review      Patient has the following on her problem list: None      Composite cancer screening  Chart review shows that this patient is due/due soon for the following Mammogram  Summary:    Patient is due/failing the following:   LDL and MAMMOGRAM    Action needed:   Patient needs fasting lab only appointment and Patient needs referral/order: mammogram    Type of outreach:    Sent letter.    Questions for provider review:    None                                                                                                                                    Sasha Mcclain CMA       Chart routed to none.

## 2017-12-06 ENCOUNTER — OFFICE VISIT (OUTPATIENT)
Dept: FAMILY MEDICINE | Facility: CLINIC | Age: 52
End: 2017-12-06
Payer: MEDICARE

## 2017-12-06 VITALS
WEIGHT: 79 LBS | TEMPERATURE: 97.4 F | SYSTOLIC BLOOD PRESSURE: 110 MMHG | DIASTOLIC BLOOD PRESSURE: 72 MMHG | HEART RATE: 92 BPM | BODY MASS INDEX: 18.28 KG/M2 | HEIGHT: 55 IN

## 2017-12-06 DIAGNOSIS — Z11.59 NEED FOR HEPATITIS C SCREENING TEST: ICD-10-CM

## 2017-12-06 DIAGNOSIS — Z12.31 VISIT FOR SCREENING MAMMOGRAM: ICD-10-CM

## 2017-12-06 DIAGNOSIS — Z23 NEED FOR PROPHYLACTIC VACCINATION AND INOCULATION AGAINST INFLUENZA: ICD-10-CM

## 2017-12-06 DIAGNOSIS — Z12.31 ENCOUNTER FOR SCREENING MAMMOGRAM FOR BREAST CANCER: ICD-10-CM

## 2017-12-06 DIAGNOSIS — Z13.6 CARDIOVASCULAR SCREENING; LDL GOAL LESS THAN 160: ICD-10-CM

## 2017-12-06 DIAGNOSIS — Z11.1 SCREENING EXAMINATION FOR PULMONARY TUBERCULOSIS: ICD-10-CM

## 2017-12-06 DIAGNOSIS — Z00.00 ROUTINE GENERAL MEDICAL EXAMINATION AT A HEALTH CARE FACILITY: Primary | ICD-10-CM

## 2017-12-06 PROCEDURE — G0008 ADMIN INFLUENZA VIRUS VAC: HCPCS | Performed by: FAMILY MEDICINE

## 2017-12-06 PROCEDURE — 86580 TB INTRADERMAL TEST: CPT | Performed by: FAMILY MEDICINE

## 2017-12-06 PROCEDURE — G0439 PPPS, SUBSEQ VISIT: HCPCS | Performed by: FAMILY MEDICINE

## 2017-12-06 PROCEDURE — 90686 IIV4 VACC NO PRSV 0.5 ML IM: CPT | Performed by: FAMILY MEDICINE

## 2017-12-06 NOTE — MR AVS SNAPSHOT
After Visit Summary   12/6/2017    Malinda Walker    MRN: [id number]           Patient Information     Date Of Birth          1965        Visit Information        Provider Department      12/6/2017 10:00 AM Salvador Gabriel MD [facility]        Today's Diagnoses     Routine general medical examination at a health care facility    -  1    Visit for screening mammogram        Need for hepatitis C screening test        CARDIOVASCULAR SCREENING; LDL GOAL LESS THAN 160        Encounter for screening mammogram for breast cancer        Screening examination for pulmonary tuberculosis        Need for prophylactic vaccination and inoculation against influenza          Care Instructions      Preventive Health Recommendations    Female Ages 65 +    Yearly exam:     See your health care provider every year in order to  o Review health changes.   o Discuss preventive care.    o Review your medicines if your doctor has prescribed any.      You no longer need a yearly Pap test unless you've had an abnormal Pap test in the past 10 years. If you have vaginal symptoms, such as bleeding or discharge, be sure to talk with your provider about a Pap test.      Every 1 to 2 years, have a mammogram.  If you are over 69, talk with your health care provider about whether or not you want to continue having screening mammograms.      Every 10 years, have a colonoscopy. Or, have a yearly FIT test (stool test). These exams will check for colon cancer.       Have a cholesterol test every 5 years, or more often if your doctor advises it.       Have a diabetes test (fasting glucose) every three years. If you are at risk for diabetes, you should have this test more often.       At age 65, have a bone density scan (DEXA) to check for osteoporosis (brittle bone disease).    Shots:    Get a flu shot each year.    Get a tetanus shot every 10 years.    Talk to your doctor about your pneumonia vaccines. There are now two  "you should receive - Pneumovax (PPSV 23) and Prevnar (PCV 13).    Talk to your doctor about the shingles vaccine.    Talk to your doctor about the hepatitis B vaccine.    Nutrition:     Eat at least 5 servings of fruits and vegetables each day.      Eat whole-grain bread, whole-wheat pasta and brown rice instead of white grains and rice.      Talk to your provider about Calcium and Vitamin D.     Lifestyle    Exercise at least 150 minutes a week (30 minutes a day, 5 days a week). This will help you control your weight and prevent disease.      Limit alcohol to one drink per day.      No smoking.       Wear sunscreen to prevent skin cancer.       See your dentist twice a year for an exam and cleaning.      See your eye doctor every 1 to 2 years to screen for conditions such as glaucoma, macular degeneration and cataracts.          Follow-ups after your visit        Who to contact     Normal or non-critical lab and imaging results will be communicated to you by [employer], letter or phone within 4 business days after the clinic has received the results. If you do not hear from us within 7 days, please contact the clinic through [employer] or phone. If you have a critical or abnormal lab result, we will notify you by phone as soon as possible.  Submit refill requests through [employer] or call your pharmacy and they will forward the refill request to us. Please allow 3 business days for your refill to be completed.          If you need to speak with a  for additional information , please call: [phone]             Additional Information About Your Visit        [employer] Information     [employer] lets you send messages to your doctor, view your test results, renew your prescriptions, schedule appointments and more. To sign up, go to [email or web address] . Click on \"Log in\" on the left side of the screen, which will take you to the Welcome page. Then click on \"Sign up Now\" on the right side of the page. " "    You will be asked to enter the access code listed below, as well as some personal information. Please follow the directions to create your username and password.     Your access code is: [email or web address]  Expires: 2018 12:18 PM     Your access code will  in 90 days. If you need help or a new code, please call your [place] clinic or [phone].        Care EveryWhere ID     This is your Care EveryWhere ID. This could be used by other organizations to access your [place] medical records  [id number]        Your Vitals Were     Pulse Temperature Height BMI (Body Mass Index)          92 97.4  F (36.3  C) (Tympanic) 4' 4.25\" (1.327 m) 20.34 kg/m2         Blood Pressure from Last 3 Encounters:   17 110/72   10/18/17 113/61   17 110/71    Weight from Last 3 Encounters:   17 79 lb (35.8 kg)   17 79 lb 3.2 oz (35.9 kg)   10/18/17 78 lb 1.6 oz (35.4 kg)              We Performed the Following     ADMIN INFLUENZA (For MEDICARE Patients ONLY) []     FLU VAC, SPLIT VIRUS IM > 3 YO (QUADRIVALENT) [37373]     TB INTRADERMAL TEST        Primary Care Provider Office Phone # Fax #    Salvador Gabriel [phone]       [facility] [address] [facility] [address]        Equal Access to Services     DEVORA MONTENEGRO : Hadii aad ku hadasho Soomaali, waaxda luqadaha, qaybta kaalmada adeegyadede, roshni liu . So [facility] [phone].    ATENCIÓN: Si raffaelela jasiel, tiene a culp disposición servicios gratuitos de asistencia lingüística. Llame al [phone].    We comply with applicable federal civil rights laws and [place] laws. We do not discriminate on the basis of race, color, national origin, age, disability, sex, sexual orientation, or gender identity.            Thank you!     Thank you for choosing [facility]  for your care. Our goal is always to provide you with excellent care. Hearing back from our patients is one way we can " continue to improve our services. Please take a few minutes to complete the written survey that you may receive in the mail after your visit with us. Thank you!             Your Updated Medication List - Protect others around you: Learn how to safely use, store and throw away your medicines at www.disposemymeds.org.      Notice  As of 12/6/2017 11:59 PM    You have not been prescribed any medications.

## 2017-12-06 NOTE — LETTER
[facility]  [address] [address]  [phone]        December 11, 2018    Malinda Walker  [address] [address]              Dear Malinda Walker    This is to remind you that your lab is due.    You may call our office at [phone] to schedule an appointment.    Please disregard this notice if you have already had your labs drawn or made an appointment.        Sincerely,        Salvador Gabriel MD

## 2017-12-06 NOTE — PROGRESS NOTES
"  SUBJECTIVE:   Malinda Walker is a 52 year old female who presents for Preventive Visit.    Are you in the first 12 months of your Medicare Part B coverage?  No    Healthy Habits:    Do you get at least three servings of calcium containing foods daily (dairy, green leafy vegetables, etc.)? no, taking calcium and/or vitamin D supplement: no    Amount of exercise or daily activities, outside of work: 0-1 day(s) per week    Problems taking medications regularly No    Medication side effects: No    Have you had an eye exam in the past two years? Not sure    Do you see a dentist twice per year? Not sure    Do you have sleep apnea, excessive snoring or daytime drowsiness?not to knowledge    She will be moving to a new foster home.  She willstill nausea=be able to se he foster sister at \"work\" regularly    Wt Readings from Last 10 Encounters:   12/06/17 79 lb (35.8 kg)   11/07/17 79 lb 3.2 oz (35.9 kg)   10/18/17 78 lb 1.6 oz (35.4 kg)   10/10/17 77 lb 1.6 oz (35 kg)   09/11/17 76 lb 3.2 oz (34.6 kg)   08/04/17 75 lb 3.2 oz (34.1 kg)   10/27/16 87 lb (39.5 kg)   08/29/16 87 lb 1.6 oz (39.5 kg)   04/28/16 86 lb 8 oz (39.2 kg)   03/30/16 87 lb 12.8 oz (39.8 kg)     Patient informed that anything we discuss that is not related to preventative medicine, may be billed for; patient verbalizes understanding.    **Follow up on weight loss. She seems to be gaining a little weight now        Reviewed and updated as needed this visit by clinical staff  Tobacco  Allergies  Meds  Med Hx  Surg Hx  Fam Hx  Soc Hx        Reviewed and updated as needed this visit by Provider        Social History   Substance Use Topics     Smoking status: Never Smoker     Smokeless tobacco: Never Used     Alcohol use No       The patient does not drink >3 drinks per day nor >7 drinks per week.     Today's PHQ-2 Score:   PHQ-2 ( 1999 Pfizer) 8/29/2016 8/25/2015   Q1: Little interest or pleasure in doing things 0 0   Q2: Feeling down, depressed or " "hopeless 0 0   PHQ-2 Score 0 0         Do you feel safe in your environment - Yes      Current providers sharing in care for this patient include:   Patient Care Team:  Salvador Gabriel MD as PCP - General      Hearing impairment: No    Ability to successfully perform activities of daily living: No, needs assistance with: transportation, shopping, preparing meals, housework, bathing, laundry, medications and managing money     Fall risk:  Fallen 2 or more times in the past year?: No  Any fall with injury in the past year?: No      Home safety:  none identified      The following health maintenance items are reviewed in Epic and correct as of today:  Health Maintenance   Topic Date Due     HEPATITIS C SCREENING  08/18/1982     HPV Q5 YEARS (Complete with PAP)  08/18/1994     LIPID MONITORING Q1 YEAR  08/14/2015     MAMMO Q1 YR  03/30/2017     MEDICARE ANNUAL WELLNESS VISIT  09/11/2018     PAP Q5 YEARS  02/11/2021     TETANUS IMMUNIZATION (SYSTEM ASSIGNED)  07/14/2021     COLON CANCER SCREEN (SYSTEM ASSIGNED)  03/30/2026     INFLUENZA VACCINE (SYSTEM ASSIGNED)  Completed       ROS:  Constitutional, HEENT, cardiovascular, pulmonary, gi and gu systems are negative, except as otherwise noted.      OBJECTIVE:   /72 (BP Location: Right arm, Patient Position: Sitting, Cuff Size: Adult Small)  Pulse 92  Temp 97.4  F (36.3  C) (Tympanic)  Ht 4' 4.25\" (1.327 m)  Wt 79 lb (35.8 kg)  BMI 20.34 kg/m2 Estimated body mass index is 20.34 kg/(m^2) as calculated from the following:    Height as of this encounter: 4' 4.25\" (1.327 m).    Weight as of this encounter: 79 lb (35.8 kg).  EXAM:   GENERAL: healthy, alert and no distress  NECK: no adenopathy, no asymmetry, masses, or scars and thyroid normal to palpation  RESP: lungs clear to auscultation - no rales, rhonchi or wheezes  CV: regular rate and rhythm, normal S1 S2, no S3 or S4, no murmur, click or rub, no peripheral edema and peripheral pulses strong  ABDOMEN: soft, " "nontender, no hepatosplenomegaly, no masses and bowel sounds normal  MS: no gross musculoskeletal defects noted, no edema    ASSESSMENT / PLAN:   1. Visit for screening mammogram      2. Need for hepatitis C screening test    - Hepatitis C Screen Reflex to HCV RNA Quant and Genotype; Future    3. CARDIOVASCULAR SCREENING; LDL GOAL LESS THAN 160      4. Encounter for screening mammogram for breast cancer      5. Routine general medical examination at a health care facility  - TB INTRADERMAL TEST    6. Screening examination for pulmonary tuberculosis    7. Need for prophylactic vaccination and inoculation against influenza  - FLU VAC, SPLIT VIRUS IM > 3 YO (QUADRIVALENT) [23856]  - ADMIN INFLUENZA (For MEDICARE Patients ONLY) []    End of Life Planning:  Patient currently has an advanced directive: No     COUNSELING:  Reviewed preventive health counseling, as reflected in patient instructions       Regular exercise       Healthy diet/nutrition       Vision screening       Hearing screening       Colon cancer screening       Hepatitis C screening    Estimated body mass index is 20.34 kg/(m^2) as calculated from the following:    Height as of this encounter: 4' 4.25\" (1.327 m).    Weight as of this encounter: 79 lb (35.8 kg).     reports that she has never smoked. She has never used smokeless tobacco.    Appropriate preventive services were discussed with this patient, including applicable screening as appropriate for cardiovascular disease, diabetes, osteopenia/osteoporosis, and glaucoma.  As appropriate for age/gender, discussed screening for colorectal cancer, prostate cancer, breast cancer, and cervical cancer. Checklist reviewing preventive services available has been given to the patient.    Reviewed patients plan of care and provided an AVS. The Intermediate Care Plan ( asthma action plan, low back pain action plan, and migraine action plan) for Malinda meets the Care Plan requirement. This Care Plan has been " established and reviewed with the Patient.    Counseling Resources:  ATP IV Guidelines  Pooled Cohorts Equation Calculator  Breast Cancer Risk Calculator  FRAX Risk Assessment  ICSI Preventive Guidelines  Dietary Guidelines for Americans, 2010  USDA's MyPlate  ASA Prophylaxis  Lung CA Screening    Salvador Gabriel MD  [facility]  Injectable Influenza Immunization Documentation    1.  Is the person to be vaccinated sick today?   No    2. Does the person to be vaccinated have an allergy to a component   of the vaccine?   No  Egg Allergy Algorithm Link    3. Has the person to be vaccinated ever had a serious reaction   to influenza vaccine in the past?   No    4. Has the person to be vaccinated ever had Guillain-Barré syndrome?   No    Form completed by Sasha Mcclain CMA

## 2017-12-06 NOTE — NURSING NOTE
"Chief Complaint   Patient presents with     Medicare Visit       Initial /72 (BP Location: Right arm, Patient Position: Sitting, Cuff Size: Adult Small)  Pulse 92  Temp 97.4  F (36.3  C) (Tympanic)  Ht 4' 4.25\" (1.327 m)  Wt 79 lb (35.8 kg)  BMI 20.34 kg/m2 Estimated body mass index is 20.34 kg/(m^2) as calculated from the following:    Height as of this encounter: 4' 4.25\" (1.327 m).    Weight as of this encounter: 79 lb (35.8 kg).  Medication Reconciliation: complete   Sasha Mcclain CMA  "

## 2017-12-08 ENCOUNTER — TELEPHONE (OUTPATIENT)
Dept: FAMILY MEDICINE | Facility: CLINIC | Age: 52
End: 2017-12-08

## 2017-12-08 DIAGNOSIS — K29.40 ATROPHIC GASTRITIS WITHOUT HEMORRHAGE: Primary | ICD-10-CM

## 2017-12-08 NOTE — TELEPHONE ENCOUNTER
Reason for Call:  Other call back    Detailed comments: Arina from [facility] (?) calling to discuss Malinda.  Would like to speak with RN or directly to Dr. Gabriel.  Please call and assess. Thank you..Edel Kaiser    Phone Number Patient can be reached at: Other phone number:  [phone] - Arina    Best Time: any time    Can we leave a detailed message on this number? YES    Call taken on 12/8/2017 at 10:22 AM by Edel Kaiser

## 2017-12-08 NOTE — TELEPHONE ENCOUNTER
Agueda note were found and faxed to group [place]. Arina notified. They are ok waiting until Monday for medication orders.  Tammi Contreras RN

## 2017-12-08 NOTE — TELEPHONE ENCOUNTER
Patient was seen in clinic on 12/6/17. Assessment notes  Are not yet complete. She is a new resident in a group home. They are needing orders for her tums BID faxed to [facility] at [phone]. They also do not have paperwork or records that said her mantoux was done. They can have their RN read it today as they can see where it was administered. It was given in her right forearm. They will need the administration orders faxed to [phone], radha Jorge RN. Please address. Thank you.  Tammi Contreras RN

## 2017-12-11 RX ORDER — CALCIUM CARBONATE 500 MG/1
1 TABLET, CHEWABLE ORAL 2 TIMES DAILY
Qty: 150 TABLET | Refills: 0 | Status: SHIPPED | OUTPATIENT
Start: 2017-12-11 | End: 2021-01-01

## 2017-12-11 NOTE — TELEPHONE ENCOUNTER
calcium carbonate (TUMS) 500 MG chewable tablet 150 tablet 0 12/11/2017  --   Sig: Take 1 tablet (500 mg) by mouth 2 times daily   Class: E-Prescribe   Route: Oral   Order: [id number]   E-Prescribing Status: Receipt confirmed by pharmacy (12/11/2017  8:12 AM CST)   Printout Tracking   External Result Report   Pharmacy   [facility]. - [place], [place] - [address]

## 2018-02-19 ENCOUNTER — CARE COORDINATION (OUTPATIENT)
Dept: CARE COORDINATION | Facility: CLINIC | Age: 53
End: 2018-02-19

## 2018-02-19 ENCOUNTER — OFFICE VISIT (OUTPATIENT)
Dept: FAMILY MEDICINE | Facility: CLINIC | Age: 53
End: 2018-02-19
Payer: MEDICARE

## 2018-02-19 VITALS
TEMPERATURE: 97.8 F | HEART RATE: 65 BPM | SYSTOLIC BLOOD PRESSURE: 113 MMHG | WEIGHT: 77 LBS | DIASTOLIC BLOOD PRESSURE: 75 MMHG | BODY MASS INDEX: 19.83 KG/M2

## 2018-02-19 DIAGNOSIS — R39.9 SYMPTOMS OF URINARY TRACT INFECTION: Primary | ICD-10-CM

## 2018-02-19 LAB
ALBUMIN SERPL-MCNC: 3.1 G/DL (ref 3.4–5)
ALBUMIN UR-MCNC: NEGATIVE MG/DL
ALP SERPL-CCNC: 73 U/L (ref 40–150)
ALT SERPL W P-5'-P-CCNC: 27 U/L (ref 0–50)
ANION GAP SERPL CALCULATED.3IONS-SCNC: 3 MMOL/L (ref 3–14)
APPEARANCE UR: CLEAR
AST SERPL W P-5'-P-CCNC: 33 U/L (ref 0–45)
BACTERIA #/AREA URNS HPF: ABNORMAL /HPF
BILIRUB SERPL-MCNC: 0.4 MG/DL (ref 0.2–1.3)
BILIRUB UR QL STRIP: NEGATIVE
BUN SERPL-MCNC: 14 MG/DL (ref 7–30)
CALCIUM SERPL-MCNC: 8.6 MG/DL (ref 8.5–10.1)
CHLORIDE SERPL-SCNC: 103 MMOL/L (ref 94–109)
CO2 SERPL-SCNC: 30 MMOL/L (ref 20–32)
COLOR UR AUTO: YELLOW
CREAT SERPL-MCNC: 0.76 MG/DL (ref 0.52–1.04)
ERYTHROCYTE [DISTWIDTH] IN BLOOD BY AUTOMATED COUNT: 14.3 % (ref 10–15)
GFR SERPL CREATININE-BSD FRML MDRD: 80 ML/MIN/1.7M2
GLUCOSE SERPL-MCNC: 76 MG/DL (ref 70–99)
GLUCOSE UR STRIP-MCNC: NEGATIVE MG/DL
HCT VFR BLD AUTO: 39.2 % (ref 35–47)
HGB BLD-MCNC: 13 G/DL (ref 11.7–15.7)
HGB UR QL STRIP: ABNORMAL
KETONES UR STRIP-MCNC: NEGATIVE MG/DL
LEUKOCYTE ESTERASE UR QL STRIP: ABNORMAL
MCH RBC QN AUTO: 31.9 PG (ref 26.5–33)
MCHC RBC AUTO-ENTMCNC: 33.2 G/DL (ref 31.5–36.5)
MCV RBC AUTO: 96 FL (ref 78–100)
MUCOUS THREADS #/AREA URNS LPF: PRESENT /LPF
NITRATE UR QL: NEGATIVE
PH UR STRIP: 5.5 PH (ref 5–7)
PLATELET # BLD AUTO: 265 10E9/L (ref 150–450)
POTASSIUM SERPL-SCNC: 3.9 MMOL/L (ref 3.4–5.3)
PROT SERPL-MCNC: 7.4 G/DL (ref 6.8–8.8)
RBC # BLD AUTO: 4.08 10E12/L (ref 3.8–5.2)
RBC #/AREA URNS AUTO: ABNORMAL /HPF
SODIUM SERPL-SCNC: 136 MMOL/L (ref 133–144)
SOURCE: ABNORMAL
SP GR UR STRIP: 1.01 (ref 1–1.03)
UROBILINOGEN UR STRIP-ACNC: 0.2 EU/DL (ref 0.2–1)
WBC # BLD AUTO: 5.8 10E9/L (ref 4–11)
WBC #/AREA URNS AUTO: ABNORMAL /HPF

## 2018-02-19 PROCEDURE — 85027 COMPLETE CBC AUTOMATED: CPT | Performed by: FAMILY MEDICINE

## 2018-02-19 PROCEDURE — 80053 COMPREHEN METABOLIC PANEL: CPT | Performed by: FAMILY MEDICINE

## 2018-02-19 PROCEDURE — 99214 OFFICE O/P EST MOD 30 MIN: CPT | Performed by: FAMILY MEDICINE

## 2018-02-19 PROCEDURE — 81001 URINALYSIS AUTO W/SCOPE: CPT | Performed by: FAMILY MEDICINE

## 2018-02-19 PROCEDURE — 36415 COLL VENOUS BLD VENIPUNCTURE: CPT | Performed by: FAMILY MEDICINE

## 2018-02-19 RX ORDER — CLINDAMYCIN HCL 300 MG
300 CAPSULE ORAL 3 TIMES DAILY
Qty: 30 CAPSULE | Refills: 0 | Status: SHIPPED | OUTPATIENT
Start: 2018-02-19 | End: 2021-01-01

## 2018-02-19 NOTE — MR AVS SNAPSHOT
"              After Visit Summary   2/19/2018    Malinda Walker    MRN: [id number]           Patient Information     Date Of Birth          1965        Visit Information        Provider Department      2/19/2018 9:20 AM Salvador Gabriel MD [facility]        Today's Diagnoses     Symptoms of urinary tract infection    -  1       Follow-ups after your visit        Your next 10 appointments already scheduled     Jul 06, 2018  3:00 PM CDT   [facility] Visit with Tacos Crystal [facility]   [facility]    [address] [place] [id number] [phone]              Who to contact     Normal or non-critical lab and imaging results will be communicated to you by MyChart, letter or phone within 4 business days after the clinic has received the results. If you do not hear from us within 7 days, please contact the clinic through MyChart or phone. If you have a critical or abnormal lab result, we will notify you by phone as soon as possible.  Submit refill requests through [employer] or call your pharmacy and they will forward the refill request to us. Please allow 3 business days for your refill to be completed.          If you need to speak with a  for additional information , please call: [phone]             Additional Information About Your Visit        [employer] Information     [employer] lets you send messages to your doctor, view your test results, renew your prescriptions, schedule appointments and more. To sign up, go to [email or web address] . Click on \"Log in\" on the left side of the screen, which will take you to the Welcome page. Then click on \"Sign up Now\" on the right side of the page.     You will be asked to enter the access code listed below, as well as some personal information. Please follow the directions to create your username and password.     Your access code is: [email or web address]  Expires: 5/28/2018 10:39 AM     Your " access code will  in 90 days. If you need help or a new code, please call your [place] clinic or [phone].        Care EveryWhere ID     This is your Care EveryWhere ID. This could be used by other organizations to access your [place] medical records  [id number]        Your Vitals Were     Pulse Temperature BMI (Body Mass Index)             65 97.8  F (36.6  C) (Tympanic) 19.83 kg/m2          Blood Pressure from Last 3 Encounters:   18 113/75   17 110/72   10/18/17 113/61    Weight from Last 3 Encounters:   18 77 lb (34.9 kg)   17 79 lb (35.8 kg)   17 79 lb 3.2 oz (35.9 kg)              We Performed the Following     *UA reflex to Microscopic and Culture ([place] and [facility] (except [place] and [place])     CBC with platelets     Comprehensive metabolic panel (BMP + Alb, Alk Phos, ALT, AST, Total. Bili, TP)     Urine Microscopic          Today's Medication Changes          These changes are accurate as of 18 11:59 PM.  If you have any questions, ask your nurse or doctor.               Start taking these medicines.        Dose/Directions    clindamycin 300 MG capsule   Commonly known as:  CLEOCIN   Used for:  Symptoms of urinary tract infection   Started by:  Salvador Gabriel MD        Dose:  300 mg   Take 1 capsule (300 mg) by mouth 3 times daily   Quantity:  30 capsule   Refills:  0            Where to get your medicines      These medications were sent to [facility] - [place], [place] - [id number] [place] [address] [address]  [address] [place] [address] [address].[facility] [address]     Phone:  [phone]     clindamycin 300 MG capsule                Primary Care Provider Office Phone # Fax #    Salvador Gabriel [phone]       [facility] [address] [facility] [address]        Equal Access to Services     DEVORA MONTENEGRO AH: Sloane marin ku hadasho Soomaali, waaxda luqadaha, qaybta kaalmada adeegyadede, waxay ar pavon. So  [facility] [phone].    ATENCIÓN: Si raffaelela jasiel, tiene a culp disposición servicios gratuitos de asistencia lingüística. Magali lee [phone].    We comply with applicable federal civil rights laws and [place] laws. We do not discriminate on the basis of race, color, national origin, age, disability, sex, sexual orientation, or gender identity.            Thank you!     Thank you for choosing [facility]  for your care. Our goal is always to provide you with excellent care. Hearing back from our patients is one way we can continue to improve our services. Please take a few minutes to complete the written survey that you may receive in the mail after your visit with us. Thank you!             Your Updated Medication List - Protect others around you: Learn how to safely use, store and throw away your medicines at www.disposemymeds.org.          This list is accurate as of 2/19/18 11:59 PM.  Always use your most recent med list.                   Brand Name Dispense Instructions for use Diagnosis    calcium carbonate 500 MG chewable tablet    TUMS    150 tablet    Take 1 tablet (500 mg) by mouth 2 times daily    Atrophic gastritis without hemorrhage       clindamycin 300 MG capsule    CLEOCIN    30 capsule    Take 1 capsule (300 mg) by mouth 3 times daily    Symptoms of urinary tract infection

## 2018-02-19 NOTE — NURSING NOTE
"Chief Complaint   Patient presents with     UTI       Initial /75 (BP Location: Right arm, Patient Position: Sitting, Cuff Size: Adult Regular)  Pulse 65  Temp 97.8  F (36.6  C) (Tympanic)  Wt 77 lb (34.9 kg)  BMI 19.83 kg/m2 Estimated body mass index is 19.83 kg/(m^2) as calculated from the following:    Height as of 12/6/17: 4' 4.25\" (1.327 m).    Weight as of this encounter: 77 lb (34.9 kg).  Medication Reconciliation: complete   Samira Abrams CMA  "
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
## 2018-02-21 ENCOUNTER — TELEPHONE (OUTPATIENT)
Dept: FAMILY MEDICINE | Facility: CLINIC | Age: 53
End: 2018-02-21

## 2018-02-21 DIAGNOSIS — R63.4 LOSS OF WEIGHT: Primary | ICD-10-CM

## 2018-02-21 NOTE — TELEPHONE ENCOUNTER
Jaci is calling from [facility].  RN from [facility] called Jaci and states that Malinda has been losing weight.  States that Malinda eats a lot of food and should be increasing weight, but is losing.  They were hoping that you would write a referral for a GI provider so they can take her in and have this checked.  Afraid that something is going on.  Please call and assess.  Thank you..Edel Beatty's number:  [phone].      Thank you..Edel Kaiser

## 2018-02-22 NOTE — TELEPHONE ENCOUNTER
Discussed gi referral  Which was made.  Discussed concerns adjusting to a new home.  Will have her recheck in 1 month.

## 2018-04-12 ENCOUNTER — RECORDS - HEALTHEAST (OUTPATIENT)
Dept: LAB | Facility: CLINIC | Age: 53
End: 2018-04-12

## 2018-04-13 LAB — BACTERIA SPEC CULT: NO GROWTH

## 2018-05-03 ENCOUNTER — RECORDS - HEALTHEAST (OUTPATIENT)
Dept: ADMINISTRATIVE | Facility: OTHER | Age: 53
End: 2018-05-03

## 2018-05-04 ENCOUNTER — HOSPITAL ENCOUNTER (OUTPATIENT)
Dept: CT IMAGING | Facility: HOSPITAL | Age: 53
Discharge: HOME | End: 2018-05-04
Attending: FAMILY MEDICINE

## 2018-05-04 DIAGNOSIS — R10.30 LOWER ABDOMINAL PAIN: ICD-10-CM

## 2018-05-09 ENCOUNTER — HOSPITAL ENCOUNTER (OUTPATIENT)
Dept: MAMMOGRAPHY | Facility: CLINIC | Age: 53
Discharge: HOME | End: 2018-05-09
Attending: FAMILY MEDICINE

## 2018-05-09 DIAGNOSIS — Z12.31 VISIT FOR SCREENING MAMMOGRAM: ICD-10-CM

## 2018-07-05 ENCOUNTER — MEDICAL CORRESPONDENCE (OUTPATIENT)
Dept: HEALTH INFORMATION MANAGEMENT | Facility: CLINIC | Age: 53
End: 2018-07-05

## 2018-07-05 ENCOUNTER — TELEPHONE (OUTPATIENT)
Dept: FAMILY MEDICINE | Facility: CLINIC | Age: 53
End: 2018-07-05

## 2018-07-22 ENCOUNTER — HEALTH MAINTENANCE LETTER (OUTPATIENT)
Age: 53
End: 2018-07-22

## 2018-10-12 ENCOUNTER — HOSPITAL ENCOUNTER (OUTPATIENT)
Dept: NEUROLOGY | Facility: CLINIC | Age: 53
Setting detail: THERAPIES SERIES
Discharge: STILL ADMITTED OR ADMINISTRATIVE | End: 2018-10-12
Attending: PSYCHIATRY & NEUROLOGY

## 2018-12-06 ENCOUNTER — TELEPHONE (OUTPATIENT)
Dept: LAB | Facility: CLINIC | Age: 53
End: 2018-12-06

## 2019-01-11 ENCOUNTER — HOSPITAL ENCOUNTER (OUTPATIENT)
Dept: NEUROLOGY | Facility: CLINIC | Age: 54
Setting detail: THERAPIES SERIES
Discharge: STILL ADMITTED OR ADMINISTRATIVE | End: 2019-01-11
Attending: PSYCHIATRY & NEUROLOGY

## 2019-01-17 ENCOUNTER — TELEPHONE (OUTPATIENT)
Dept: FAMILY MEDICINE | Facility: CLINIC | Age: 54
End: 2019-01-17

## 2019-02-07 ENCOUNTER — TELEPHONE (OUTPATIENT)
Dept: FAMILY MEDICINE | Facility: CLINIC | Age: 54
End: 2019-02-07

## 2019-02-07 DIAGNOSIS — Z12.11 SPECIAL SCREENING FOR MALIGNANT NEOPLASMS, COLON: Primary | ICD-10-CM

## 2019-02-08 ENCOUNTER — TELEPHONE (OUTPATIENT)
Dept: FAMILY MEDICINE | Facility: CLINIC | Age: 54
End: 2019-02-08

## 2019-02-08 NOTE — TELEPHONE ENCOUNTER
Panel Management Review      Patient has the following on her problem list: None      Composite cancer screening  Chart review shows that this patient is due/due soon for the following Fecal Colorectal (FIT)  Summary:    Patient is due/failing the following:   FIT    Action needed:   Patient needs referral/order: FIT test    Type of outreach:    Phone, spoke to patient.  Contacted group home and let them know that the order is placed and they can come anytime to get the kit. They are aware that they cannot mail it due to cold weather.     Questions for provider review:    None                                                                                                                                    Sasha Mcclain CMA     Chart routed to none.

## 2019-06-25 ENCOUNTER — RECORDS - HEALTHEAST (OUTPATIENT)
Dept: LAB | Facility: CLINIC | Age: 54
End: 2019-06-25

## 2019-06-26 LAB — PROLACTIN SERPL-MCNC: 11.3 NG/ML (ref 0–20)

## 2019-10-09 ENCOUNTER — TELEPHONE (OUTPATIENT)
Dept: FAMILY MEDICINE | Facility: CLINIC | Age: 54
End: 2019-10-09

## 2019-10-17 ENCOUNTER — HOSPITAL ENCOUNTER (OUTPATIENT)
Dept: MAMMOGRAPHY | Facility: CLINIC | Age: 54
Discharge: HOME | End: 2019-10-17
Attending: FAMILY MEDICINE

## 2019-10-17 DIAGNOSIS — Z12.31 VISIT FOR SCREENING MAMMOGRAM: ICD-10-CM

## 2019-10-30 ENCOUNTER — TRANSFERRED RECORDS (OUTPATIENT)
Dept: HEALTH INFORMATION MANAGEMENT | Facility: CLINIC | Age: 54
End: 2019-10-30

## 2020-01-01 ENCOUNTER — RECORDS - HEALTHEAST (OUTPATIENT)
Dept: LAB | Facility: CLINIC | Age: 55
End: 2020-01-01

## 2020-01-01 ENCOUNTER — PATIENT OUTREACH (OUTPATIENT)
Dept: CARE COORDINATION | Facility: CLINIC | Age: 55
End: 2020-01-01

## 2020-01-01 ENCOUNTER — DOCUMENTATION ONLY (OUTPATIENT)
Dept: OTHER | Facility: CLINIC | Age: 55
End: 2020-01-01

## 2020-01-01 ENCOUNTER — AMBULATORY - HEALTHEAST (OUTPATIENT)
Dept: OTHER | Facility: CLINIC | Age: 55
End: 2020-01-01

## 2020-01-01 DIAGNOSIS — Z20.822 SUSPECTED COVID-19 VIRUS INFECTION: Primary | ICD-10-CM

## 2020-01-01 DIAGNOSIS — G40.909 SEIZURE DISORDER (H): ICD-10-CM

## 2020-01-01 LAB
ANION GAP SERPL CALCULATED.3IONS-SCNC: 9 MMOL/L (ref 5–18)
BUN SERPL-MCNC: 15 MG/DL (ref 8–22)
CALCIUM SERPL-MCNC: 8.6 MG/DL (ref 8.5–10.5)
CHLORIDE BLD-SCNC: 110 MMOL/L (ref 98–107)
CO2 SERPL-SCNC: 27 MMOL/L (ref 22–31)
CREAT SERPL-MCNC: 0.69 MG/DL (ref 0.6–1.1)
GFR SERPL CREATININE-BSD FRML MDRD: >60 ML/MIN/1.73M2
GLUCOSE BLD-MCNC: 78 MG/DL (ref 70–125)
POTASSIUM BLD-SCNC: 4.2 MMOL/L (ref 3.5–5)
SODIUM SERPL-SCNC: 146 MMOL/L (ref 136–145)

## 2020-01-01 RX ORDER — LEVETIRACETAM 250 MG/1
TABLET ORAL
Qty: 62 TABLET | Refills: 11 | Status: SHIPPED | OUTPATIENT
Start: 2020-01-01 | End: 2021-01-01

## 2020-01-01 ASSESSMENT — ACTIVITIES OF DAILY LIVING (ADL)
DEPENDENT_IADLS:: CLEANING;COOKING;LAUNDRY;SHOPPING;MEAL PREPARATION;INCONTINENCE;TRANSPORTATION;MONEY MANAGEMENT;MEDICATION MANAGEMENT

## 2020-08-28 DIAGNOSIS — G40.909 SEIZURE DISORDER (H): Primary | ICD-10-CM

## 2020-08-28 RX ORDER — LEVETIRACETAM 250 MG/1
250 TABLET ORAL 2 TIMES DAILY
Qty: 60 TABLET | Refills: 1 | Status: SHIPPED | OUTPATIENT
Start: 2020-08-28 | End: 2020-01-01

## 2020-08-28 RX ORDER — LEVETIRACETAM 250 MG/1
250 TABLET ORAL 2 TIMES DAILY
COMMUNITY
Start: 2019-06-23 | End: 2020-08-28

## 2020-08-28 NOTE — TELEPHONE ENCOUNTER
Medication T'd for review and signature  Will also call as she is overdue for a 6 month follow up.   Ok for face to face or would you prefer Video?   Bakari Kelsey on 8/28/2020 at 12:03 PM

## 2020-11-05 NOTE — PROGRESS NOTES
"Clinic Care Coordination Contact    SOCORRO Clinic Care Coordination Contact  OUTREACH    Referral Information:  Referral Source: ED Follow-Up    Primary Diagnosis: Other (include Comment box)(COVID 19)     Universal Utilization:    Clinic Utilization: [facility]   Clinical Concerns:  Current Medical Concerns: Patient with a hx of Down's syndrome, dementia, she is nonverbal. Patient presented to ED on 11/4/2020 with cough and runny nose. Group [place] staff brought her to ED to get a COVID test. Test pending.   Staff at [facility] have patient quarantined at this time, she is eating and drinking. No fevers, patient continues to have cough and runny nose. Staff said,\" she is doing really well.\"   Current Behavioral Concerns: None  Education Provided to patient: TONJA QUINTANILLA called and spoke with patient; introduced self, discussed role of Care Coordination, and explained reason for call.    Pain  Pain (GOAL):: No  Health Maintenance Reviewed: Up to date    Functional Status:  Dependent ADLs:: Incontinence  Dependent IADLs:: Cleaning, Cooking, Laundry, Shopping, Meal Preparation, Incontinence, Transportation, Money Management, Medication Management  Bed or wheelchair confined:: No  Mobility Status: Independent    Living Situation:  Current living arrangement:: Other(Group Home)  Type of residence:: Group home      Inadequate nutrition (GOAL):: No  Tube Feeding: No  Inadequate activity/exercise (GOAL):: No  Significant changes in sleep pattern (GOAL): No  Transportation means:: Medical transport([facility])     Tenriism or spiritual beliefs that impact treatment:: No  Mental health DX:: Yes  Mental health DX how managed:: Medication  Mental health management concern (GOAL):: No  Informal Support system:: Other(Group Home Staff)   Socioeconomic History     Marital status: Single     Spouse name: Not on file     Number of children: 0     Years of education: 12     Highest education level: Not on file   Occupational History "     Employer: DISABLED     Tobacco Use     Smoking status: Never Smoker     Smokeless tobacco: Never Used   Substance and Sexual Activity     Alcohol use: No     Drug use: No     Sexual activity: Never     Birth control/protection: Injection        Resources and Interventions:  Current Resources: Group Home, Guardian      Community Resources: , County Worker, Other (see comment)(Guardian)  Supplies used at home:: Incontinence Supplies  Equipment Currently Used at Home: grab bar, tub/shower, grab bar, toilet  Employment Status: disabled)  Advance Care Plan/Directive  Advanced Care Plans/Directives on file:: No  Advanced Care Plan/Directive Status: Not Applicable    Referrals Placed: None       Patient/Caregiver understanding: Patient verbalized understanding, engaged in AIDET communication during patient encounter.      Plan: No further outreaches will be made at this time unless a new referral is made or a change in the pt's status occurs. Patient was provided with [facility] contact information and encouraged to call with any questions or concerns.  Staff to call patient's group home with COVID results.     CANDICE Beckwith  Clinic Care Coordinator  [phone]  [email or web address]

## 2021-01-01 ENCOUNTER — TRANSFERRED RECORDS (OUTPATIENT)
Dept: HEALTH INFORMATION MANAGEMENT | Facility: CLINIC | Age: 56
End: 2021-01-01

## 2021-01-01 ENCOUNTER — VIRTUAL VISIT (OUTPATIENT)
Dept: NEUROLOGY | Facility: CLINIC | Age: 56
End: 2021-01-01
Payer: MEDICARE

## 2021-01-01 ENCOUNTER — APPOINTMENT (OUTPATIENT)
Dept: SPEECH THERAPY | Facility: CLINIC | Age: 56
DRG: 871 | End: 2021-01-01
Attending: INTERNAL MEDICINE
Payer: MEDICARE

## 2021-01-01 ENCOUNTER — APPOINTMENT (OUTPATIENT)
Dept: RADIOLOGY | Facility: HOSPITAL | Age: 56
End: 2021-01-01
Attending: EMERGENCY MEDICINE
Payer: MEDICARE

## 2021-01-01 ENCOUNTER — TELEPHONE (OUTPATIENT)
Dept: FAMILY MEDICINE | Facility: CLINIC | Age: 56
End: 2021-01-01

## 2021-01-01 ENCOUNTER — RECORDS - HEALTHEAST (OUTPATIENT)
Dept: LAB | Facility: CLINIC | Age: 56
End: 2021-01-01

## 2021-01-01 ENCOUNTER — HOSPITAL ENCOUNTER (INPATIENT)
Facility: CLINIC | Age: 56
LOS: 4 days | Discharge: HOME HEALTH | DRG: 871 | End: 2021-09-20
Attending: INTERNAL MEDICINE | Admitting: INTERNAL MEDICINE
Payer: MEDICARE

## 2021-01-01 ENCOUNTER — HOSPITAL ENCOUNTER (EMERGENCY)
Facility: HOSPITAL | Age: 56
Discharge: ANOTHER HOSPITAL | End: 2021-09-16
Attending: EMERGENCY MEDICINE | Admitting: EMERGENCY MEDICINE
Payer: MEDICARE

## 2021-01-01 ENCOUNTER — APPOINTMENT (OUTPATIENT)
Dept: CT IMAGING | Facility: HOSPITAL | Age: 56
End: 2021-01-01
Attending: EMERGENCY MEDICINE
Payer: MEDICARE

## 2021-01-01 VITALS
WEIGHT: 79 LBS | TEMPERATURE: 101.7 F | DIASTOLIC BLOOD PRESSURE: 59 MMHG | BODY MASS INDEX: 18.28 KG/M2 | HEIGHT: 55 IN | OXYGEN SATURATION: 98 % | HEART RATE: 68 BPM | RESPIRATION RATE: 11 BRPM | SYSTOLIC BLOOD PRESSURE: 100 MMHG

## 2021-01-01 VITALS — WEIGHT: 76 LBS | HEIGHT: 55 IN | BODY MASS INDEX: 17.59 KG/M2

## 2021-01-01 VITALS
HEART RATE: 68 BPM | HEIGHT: 55 IN | BODY MASS INDEX: 19.39 KG/M2 | SYSTOLIC BLOOD PRESSURE: 116 MMHG | WEIGHT: 83.79 LBS | TEMPERATURE: 97 F | OXYGEN SATURATION: 94 % | DIASTOLIC BLOOD PRESSURE: 55 MMHG | RESPIRATION RATE: 16 BRPM

## 2021-01-01 VITALS — HEIGHT: 55 IN | BODY MASS INDEX: 18.28 KG/M2 | WEIGHT: 79 LBS

## 2021-01-01 DIAGNOSIS — G40.909 SEIZURE DISORDER (H): Primary | ICD-10-CM

## 2021-01-01 DIAGNOSIS — J18.9 PNEUMONIA OF BOTH LOWER LOBES DUE TO INFECTIOUS ORGANISM: ICD-10-CM

## 2021-01-01 DIAGNOSIS — R65.20 SEVERE SEPSIS (H): ICD-10-CM

## 2021-01-01 DIAGNOSIS — A41.9 SEVERE SEPSIS (H): ICD-10-CM

## 2021-01-01 DIAGNOSIS — J69.0 ASPIRATION PNEUMONITIS (H): Primary | ICD-10-CM

## 2021-01-01 DIAGNOSIS — Q90.9 DOWN'S SYNDROME: ICD-10-CM

## 2021-01-01 DIAGNOSIS — R09.02 HYPOXIA: ICD-10-CM

## 2021-01-01 LAB
ALBUMIN SERPL-MCNC: 1.7 G/DL (ref 3.4–5)
ALBUMIN SERPL-MCNC: 2.9 G/DL (ref 3.5–5)
ALBUMIN SERPL-MCNC: 3 G/DL (ref 3.5–5)
ALBUMIN UR-MCNC: 10 MG/DL
ALP SERPL-CCNC: 49 U/L (ref 40–150)
ALP SERPL-CCNC: 87 U/L (ref 45–120)
ALP SERPL-CCNC: 87 U/L (ref 45–120)
ALT SERPL W P-5'-P-CCNC: 22 U/L (ref 0–50)
ALT SERPL W P-5'-P-CCNC: 31 U/L (ref 0–45)
ALT SERPL W P-5'-P-CCNC: 32 U/L (ref 0–45)
ANION GAP SERPL CALCULATED.3IONS-SCNC: 11 MMOL/L (ref 5–18)
ANION GAP SERPL CALCULATED.3IONS-SCNC: 11 MMOL/L (ref 5–18)
ANION GAP SERPL CALCULATED.3IONS-SCNC: 3 MMOL/L (ref 3–14)
ANION GAP SERPL CALCULATED.3IONS-SCNC: 6 MMOL/L (ref 3–14)
APPEARANCE UR: CLEAR
AST SERPL W P-5'-P-CCNC: 24 U/L (ref 0–45)
AST SERPL W P-5'-P-CCNC: 40 U/L (ref 0–40)
AST SERPL W P-5'-P-CCNC: 42 U/L (ref 0–40)
ATRIAL RATE - MUSE: 113 BPM
BACTERIA BLD CULT: NO GROWTH
BASE EXCESS BLDA CALC-SCNC: 0.5 MMOL/L (ref -9–1.8)
BASE EXCESS BLDV CALC-SCNC: 5.8 MMOL/L
BASOPHILS # BLD AUTO: 0.1 10E3/UL (ref 0–0.2)
BASOPHILS NFR BLD AUTO: 1 %
BILIRUB SERPL-MCNC: 0.4 MG/DL (ref 0–1)
BILIRUB SERPL-MCNC: 0.5 MG/DL (ref 0–1)
BILIRUB SERPL-MCNC: 1.1 MG/DL (ref 0.2–1.3)
BILIRUB UR QL STRIP: NEGATIVE
BUN SERPL-MCNC: 12 MG/DL (ref 7–30)
BUN SERPL-MCNC: 15 MG/DL (ref 8–22)
BUN SERPL-MCNC: 22 MG/DL (ref 8–22)
BUN SERPL-MCNC: 7 MG/DL (ref 7–30)
C REACTIVE PROTEIN LHE: 0.5 MG/DL (ref 0–0.8)
CALCIUM SERPL-MCNC: 7.1 MG/DL (ref 8.5–10.1)
CALCIUM SERPL-MCNC: 7.4 MG/DL (ref 8.5–10.1)
CALCIUM SERPL-MCNC: 8.7 MG/DL (ref 8.5–10.5)
CALCIUM SERPL-MCNC: 9.1 MG/DL (ref 8.5–10.5)
CHLORIDE BLD-SCNC: 100 MMOL/L (ref 98–107)
CHLORIDE BLD-SCNC: 104 MMOL/L (ref 98–107)
CHLORIDE BLD-SCNC: 114 MMOL/L (ref 94–109)
CHLORIDE BLD-SCNC: 115 MMOL/L (ref 94–109)
CO2 SERPL-SCNC: 25 MMOL/L (ref 20–32)
CO2 SERPL-SCNC: 26 MMOL/L (ref 22–31)
CO2 SERPL-SCNC: 27 MMOL/L (ref 20–32)
CO2 SERPL-SCNC: 27 MMOL/L (ref 22–31)
COLOR UR AUTO: YELLOW
CREAT SERPL-MCNC: 0.62 MG/DL (ref 0.52–1.04)
CREAT SERPL-MCNC: 0.64 MG/DL (ref 0.52–1.04)
CREAT SERPL-MCNC: 0.68 MG/DL (ref 0.52–1.04)
CREAT SERPL-MCNC: 0.68 MG/DL (ref 0.52–1.04)
CREAT SERPL-MCNC: 0.7 MG/DL (ref 0.6–1.1)
CREAT SERPL-MCNC: 0.71 MG/DL (ref 0.52–1.04)
CREAT SERPL-MCNC: 0.83 MG/DL (ref 0.6–1.1)
DIASTOLIC BLOOD PRESSURE - MUSE: 52 MMHG
EOSINOPHIL # BLD AUTO: 0 10E3/UL (ref 0–0.7)
EOSINOPHIL NFR BLD AUTO: 0 %
ERYTHROCYTE [DISTWIDTH] IN BLOOD BY AUTOMATED COUNT: 14.7 % (ref 10–15)
ERYTHROCYTE [DISTWIDTH] IN BLOOD BY AUTOMATED COUNT: 14.8 % (ref 10–15)
ERYTHROCYTE [DISTWIDTH] IN BLOOD BY AUTOMATED COUNT: 15.2 % (ref 10–15)
ERYTHROCYTE [DISTWIDTH] IN BLOOD BY AUTOMATED COUNT: 18.4 % (ref 11–14.5)
GFR SERPL CREATININE-BSD FRML MDRD: 79 ML/MIN/1.73M2
GFR SERPL CREATININE-BSD FRML MDRD: >60 ML/MIN/1.73M2
GFR SERPL CREATININE-BSD FRML MDRD: >90 ML/MIN/1.73M2
GLUCOSE BLD-MCNC: 72 MG/DL (ref 70–125)
GLUCOSE BLD-MCNC: 89 MG/DL (ref 70–99)
GLUCOSE BLD-MCNC: 93 MG/DL (ref 70–99)
GLUCOSE BLD-MCNC: 97 MG/DL (ref 70–125)
GLUCOSE BLD-MCNC: 98 MG/DL (ref 70–99)
GLUCOSE BLDC GLUCOMTR-MCNC: 100 MG/DL (ref 70–99)
GLUCOSE BLDC GLUCOMTR-MCNC: 102 MG/DL (ref 70–99)
GLUCOSE BLDC GLUCOMTR-MCNC: 105 MG/DL (ref 70–99)
GLUCOSE BLDC GLUCOMTR-MCNC: 106 MG/DL (ref 70–99)
GLUCOSE BLDC GLUCOMTR-MCNC: 108 MG/DL (ref 70–99)
GLUCOSE BLDC GLUCOMTR-MCNC: 110 MG/DL (ref 70–99)
GLUCOSE BLDC GLUCOMTR-MCNC: 112 MG/DL (ref 70–99)
GLUCOSE BLDC GLUCOMTR-MCNC: 115 MG/DL (ref 70–99)
GLUCOSE BLDC GLUCOMTR-MCNC: 63 MG/DL (ref 70–99)
GLUCOSE BLDC GLUCOMTR-MCNC: 63 MG/DL (ref 70–99)
GLUCOSE BLDC GLUCOMTR-MCNC: 68 MG/DL (ref 70–99)
GLUCOSE BLDC GLUCOMTR-MCNC: 72 MG/DL (ref 70–99)
GLUCOSE BLDC GLUCOMTR-MCNC: 76 MG/DL (ref 70–99)
GLUCOSE BLDC GLUCOMTR-MCNC: 76 MG/DL (ref 70–99)
GLUCOSE BLDC GLUCOMTR-MCNC: 78 MG/DL (ref 70–99)
GLUCOSE BLDC GLUCOMTR-MCNC: 79 MG/DL (ref 70–99)
GLUCOSE BLDC GLUCOMTR-MCNC: 81 MG/DL (ref 70–99)
GLUCOSE BLDC GLUCOMTR-MCNC: 81 MG/DL (ref 70–99)
GLUCOSE BLDC GLUCOMTR-MCNC: 82 MG/DL (ref 70–99)
GLUCOSE BLDC GLUCOMTR-MCNC: 83 MG/DL (ref 70–99)
GLUCOSE BLDC GLUCOMTR-MCNC: 84 MG/DL (ref 70–99)
GLUCOSE BLDC GLUCOMTR-MCNC: 84 MG/DL (ref 70–99)
GLUCOSE BLDC GLUCOMTR-MCNC: 85 MG/DL (ref 70–99)
GLUCOSE BLDC GLUCOMTR-MCNC: 87 MG/DL (ref 70–99)
GLUCOSE BLDC GLUCOMTR-MCNC: 92 MG/DL (ref 70–99)
GLUCOSE BLDC GLUCOMTR-MCNC: 92 MG/DL (ref 70–99)
GLUCOSE UR STRIP-MCNC: NEGATIVE MG/DL
HCO3 BLD-SCNC: 26 MMOL/L (ref 21–28)
HCO3 BLDV-SCNC: 27 MMOL/L (ref 24–30)
HCT VFR BLD AUTO: 29.1 % (ref 35–47)
HCT VFR BLD AUTO: 29.3 % (ref 35–47)
HCT VFR BLD AUTO: 37.4 % (ref 35–47)
HCT VFR BLD AUTO: 40.4 % (ref 35–47)
HGB BLD-MCNC: 12.7 G/DL (ref 12–16)
HGB BLD-MCNC: 13.4 G/DL (ref 11.7–15.7)
HGB BLD-MCNC: 9.2 G/DL (ref 11.7–15.7)
HGB BLD-MCNC: 9.6 G/DL (ref 11.7–15.7)
HGB UR QL STRIP: NEGATIVE
IMM GRANULOCYTES # BLD: 0 10E3/UL
IMM GRANULOCYTES NFR BLD: 0 %
INTERPRETATION ECG - MUSE: NORMAL
KETONES UR STRIP-MCNC: ABNORMAL MG/DL
LACTATE SERPL-SCNC: 0.8 MMOL/L (ref 0.7–2)
LACTATE SERPL-SCNC: 2.3 MMOL/L (ref 0.7–2)
LACTATE SERPL-SCNC: 4.1 MMOL/L (ref 0.7–2)
LEUKOCYTE ESTERASE UR QL STRIP: NEGATIVE
LEVETIRACETAM (KEPPRA): 33.1 UG/ML (ref 6–46)
LIPASE SERPL-CCNC: <9 U/L (ref 0–52)
LYMPHOCYTES # BLD AUTO: 0.5 10E3/UL (ref 0.8–5.3)
LYMPHOCYTES NFR BLD AUTO: 5 %
MCH RBC QN AUTO: 33.5 PG (ref 26.5–33)
MCH RBC QN AUTO: 35.1 PG (ref 27–34)
MCH RBC QN AUTO: 35.2 PG (ref 26.5–33)
MCH RBC QN AUTO: 35.3 PG (ref 26.5–33)
MCHC RBC AUTO-ENTMCNC: 31.6 G/DL (ref 31.5–36.5)
MCHC RBC AUTO-ENTMCNC: 32.8 G/DL (ref 31.5–36.5)
MCHC RBC AUTO-ENTMCNC: 33.2 G/DL (ref 31.5–36.5)
MCHC RBC AUTO-ENTMCNC: 34 G/DL (ref 32–36)
MCV RBC AUTO: 103 FL (ref 80–100)
MCV RBC AUTO: 106 FL (ref 78–100)
MCV RBC AUTO: 106 FL (ref 78–100)
MCV RBC AUTO: 107 FL (ref 78–100)
MONOCYTES # BLD AUTO: 0.4 10E3/UL (ref 0–1.3)
MONOCYTES NFR BLD AUTO: 4 %
MRSA DNA SPEC QL NAA+PROBE: NEGATIVE
MUCOUS THREADS #/AREA URNS LPF: PRESENT /LPF
NEUTROPHILS # BLD AUTO: 8.4 10E3/UL (ref 1.6–8.3)
NEUTROPHILS NFR BLD AUTO: 90 %
NITRATE UR QL: NEGATIVE
NRBC # BLD AUTO: 0 10E3/UL
NRBC BLD AUTO-RTO: 0 /100
O2/TOTAL GAS SETTING VFR VENT: 21 %
OXYHGB MFR BLDV: 32.2 % (ref 70–75)
P AXIS - MUSE: 65 DEGREES
PCO2 BLD: 43 MM HG (ref 35–45)
PCO2 BLDV: 57 MM HG (ref 35–50)
PH BLD: 7.39 [PH] (ref 7.35–7.45)
PH BLDV: 7.35 [PH] (ref 7.35–7.45)
PH UR STRIP: 6 [PH] (ref 5–7)
PLATELET # BLD AUTO: 125 10E3/UL (ref 150–450)
PLATELET # BLD AUTO: 130 10E3/UL (ref 150–450)
PLATELET # BLD AUTO: 152 10E3/UL (ref 150–450)
PLATELET # BLD AUTO: 190 10E3/UL (ref 150–450)
PLATELET # BLD AUTO: 217 THOU/UL (ref 140–440)
PMV BLD AUTO: 12 FL (ref 8.5–12.5)
PO2 BLD: 67 MM HG (ref 80–105)
PO2 BLDV: 22 MM HG (ref 25–47)
POTASSIUM BLD-SCNC: 3 MMOL/L (ref 3.4–5.3)
POTASSIUM BLD-SCNC: 3.2 MMOL/L (ref 3.4–5.3)
POTASSIUM BLD-SCNC: 3.4 MMOL/L (ref 3.4–5.3)
POTASSIUM BLD-SCNC: 3.4 MMOL/L (ref 3.4–5.3)
POTASSIUM BLD-SCNC: 3.6 MMOL/L (ref 3.4–5.3)
POTASSIUM BLD-SCNC: 4.1 MMOL/L (ref 3.5–5)
POTASSIUM BLD-SCNC: 5.3 MMOL/L (ref 3.5–5)
PR INTERVAL - MUSE: 156 MS
PROCALCITONIN SERPL-MCNC: 0.76 NG/ML (ref 0–0.49)
PROT SERPL-MCNC: 4.9 G/DL (ref 6.8–8.8)
PROT SERPL-MCNC: 7.1 G/DL (ref 6–8)
PROT SERPL-MCNC: 7.4 G/DL (ref 6–8)
QRS DURATION - MUSE: 76 MS
QT - MUSE: 300 MS
QTC - MUSE: 411 MS
R AXIS - MUSE: 76 DEGREES
RBC # BLD AUTO: 2.73 10E6/UL (ref 3.8–5.2)
RBC # BLD AUTO: 2.75 10E6/UL (ref 3.8–5.2)
RBC # BLD AUTO: 3.62 MILL/UL (ref 3.8–5.4)
RBC # BLD AUTO: 3.8 10E6/UL (ref 3.8–5.2)
RBC URINE: 1 /HPF
SA TARGET DNA: NEGATIVE
SAO2 % BLDV: 32.8 % (ref 70–75)
SARS-COV-2 RNA RESP QL NAA+PROBE: NEGATIVE
SODIUM SERPL-SCNC: 138 MMOL/L (ref 136–145)
SODIUM SERPL-SCNC: 141 MMOL/L (ref 136–145)
SODIUM SERPL-SCNC: 144 MMOL/L (ref 133–144)
SODIUM SERPL-SCNC: 146 MMOL/L (ref 133–144)
SP GR UR STRIP: >1.05 (ref 1–1.03)
SQUAMOUS EPITHELIAL: 1 /HPF
SYSTOLIC BLOOD PRESSURE - MUSE: 92 MMHG
T AXIS - MUSE: -20 DEGREES
TROPONIN I SERPL-MCNC: <0.01 NG/ML (ref 0–0.29)
TSH SERPL DL<=0.005 MIU/L-ACNC: 2.38 UIU/ML (ref 0.3–5)
UROBILINOGEN UR STRIP-MCNC: <2 MG/DL
VANCOMYCIN SERPL-MCNC: 11.7 MG/L
VANCOMYCIN SERPL-MCNC: 20.4 MG/L
VENTRICULAR RATE- MUSE: 113 BPM
WBC # BLD AUTO: 7.7 10E3/UL (ref 4–11)
WBC # BLD AUTO: 9.4 10E3/UL (ref 4–11)
WBC # BLD AUTO: 9.6 10E3/UL (ref 4–11)
WBC URINE: 2 /HPF
WBC: 5.1 THOU/UL (ref 4–11)

## 2021-01-01 PROCEDURE — 99214 OFFICE O/P EST MOD 30 MIN: CPT | Mod: 95 | Performed by: PSYCHIATRY & NEUROLOGY

## 2021-01-01 PROCEDURE — 99233 SBSQ HOSP IP/OBS HIGH 50: CPT | Performed by: HOSPITALIST

## 2021-01-01 PROCEDURE — 258N000001 HC RX 258: Performed by: INTERNAL MEDICINE

## 2021-01-01 PROCEDURE — 99233 SBSQ HOSP IP/OBS HIGH 50: CPT | Performed by: INTERNAL MEDICINE

## 2021-01-01 PROCEDURE — 87040 BLOOD CULTURE FOR BACTERIA: CPT | Performed by: EMERGENCY MEDICINE

## 2021-01-01 PROCEDURE — 87641 MR-STAPH DNA AMP PROBE: CPT | Performed by: HOSPITALIST

## 2021-01-01 PROCEDURE — 74177 CT ABD & PELVIS W/CONTRAST: CPT

## 2021-01-01 PROCEDURE — 250N000013 HC RX MED GY IP 250 OP 250 PS 637: Performed by: INTERNAL MEDICINE

## 2021-01-01 PROCEDURE — 250N000011 HC RX IP 250 OP 636: Performed by: EMERGENCY MEDICINE

## 2021-01-01 PROCEDURE — 258N000003 HC RX IP 258 OP 636: Performed by: INTERNAL MEDICINE

## 2021-01-01 PROCEDURE — 200N000001 HC R&B ICU

## 2021-01-01 PROCEDURE — 82805 BLOOD GASES W/O2 SATURATION: CPT | Performed by: EMERGENCY MEDICINE

## 2021-01-01 PROCEDURE — 250N000011 HC RX IP 250 OP 636: Performed by: INTERNAL MEDICINE

## 2021-01-01 PROCEDURE — 83690 ASSAY OF LIPASE: CPT | Performed by: EMERGENCY MEDICINE

## 2021-01-01 PROCEDURE — 82565 ASSAY OF CREATININE: CPT | Performed by: INTERNAL MEDICINE

## 2021-01-01 PROCEDURE — 250N000009 HC RX 250: Performed by: INTERNAL MEDICINE

## 2021-01-01 PROCEDURE — 99239 HOSP IP/OBS DSCHRG MGMT >30: CPT | Performed by: HOSPITALIST

## 2021-01-01 PROCEDURE — 3E043XZ INTRODUCTION OF VASOPRESSOR INTO CENTRAL VEIN, PERCUTANEOUS APPROACH: ICD-10-PCS | Performed by: INTERNAL MEDICINE

## 2021-01-01 PROCEDURE — 80048 BASIC METABOLIC PNL TOTAL CA: CPT | Performed by: INTERNAL MEDICINE

## 2021-01-01 PROCEDURE — 85049 AUTOMATED PLATELET COUNT: CPT | Performed by: INTERNAL MEDICINE

## 2021-01-01 PROCEDURE — 83605 ASSAY OF LACTIC ACID: CPT | Performed by: EMERGENCY MEDICINE

## 2021-01-01 PROCEDURE — 258N000003 HC RX IP 258 OP 636: Performed by: EMERGENCY MEDICINE

## 2021-01-01 PROCEDURE — 80053 COMPREHEN METABOLIC PANEL: CPT | Performed by: EMERGENCY MEDICINE

## 2021-01-01 PROCEDURE — 999N000157 HC STATISTIC RCP TIME EA 10 MIN

## 2021-01-01 PROCEDURE — 250N000009 HC RX 250: Performed by: EMERGENCY MEDICINE

## 2021-01-01 PROCEDURE — 85027 COMPLETE CBC AUTOMATED: CPT | Performed by: INTERNAL MEDICINE

## 2021-01-01 PROCEDURE — 87635 SARS-COV-2 COVID-19 AMP PRB: CPT | Performed by: EMERGENCY MEDICINE

## 2021-01-01 PROCEDURE — 120N000001 HC R&B MED SURG/OB

## 2021-01-01 PROCEDURE — 36569 INSJ PICC 5 YR+ W/O IMAGING: CPT

## 2021-01-01 PROCEDURE — 99291 CRITICAL CARE FIRST HOUR: CPT | Performed by: INTERNAL MEDICINE

## 2021-01-01 PROCEDURE — 250N000013 HC RX MED GY IP 250 OP 250 PS 637: Performed by: EMERGENCY MEDICINE

## 2021-01-01 PROCEDURE — 82040 ASSAY OF SERUM ALBUMIN: CPT | Performed by: INTERNAL MEDICINE

## 2021-01-01 PROCEDURE — 36415 COLL VENOUS BLD VENIPUNCTURE: CPT | Performed by: EMERGENCY MEDICINE

## 2021-01-01 PROCEDURE — C9113 INJ PANTOPRAZOLE SODIUM, VIA: HCPCS | Performed by: INTERNAL MEDICINE

## 2021-01-01 PROCEDURE — 96365 THER/PROPH/DIAG IV INF INIT: CPT

## 2021-01-01 PROCEDURE — C9803 HOPD COVID-19 SPEC COLLECT: HCPCS

## 2021-01-01 PROCEDURE — 99291 CRITICAL CARE FIRST HOUR: CPT | Mod: 25

## 2021-01-01 PROCEDURE — 80202 ASSAY OF VANCOMYCIN: CPT | Performed by: INTERNAL MEDICINE

## 2021-01-01 PROCEDURE — 96361 HYDRATE IV INFUSION ADD-ON: CPT | Mod: 59

## 2021-01-01 PROCEDURE — 93005 ELECTROCARDIOGRAM TRACING: CPT | Performed by: EMERGENCY MEDICINE

## 2021-01-01 PROCEDURE — 85004 AUTOMATED DIFF WBC COUNT: CPT | Performed by: EMERGENCY MEDICINE

## 2021-01-01 PROCEDURE — 92526 ORAL FUNCTION THERAPY: CPT | Mod: GN

## 2021-01-01 PROCEDURE — 82947 ASSAY GLUCOSE BLOOD QUANT: CPT | Performed by: HOSPITALIST

## 2021-01-01 PROCEDURE — 93005 ELECTROCARDIOGRAM TRACING: CPT | Mod: 59

## 2021-01-01 PROCEDURE — 84484 ASSAY OF TROPONIN QUANT: CPT | Performed by: EMERGENCY MEDICINE

## 2021-01-01 PROCEDURE — 36600 WITHDRAWAL OF ARTERIAL BLOOD: CPT

## 2021-01-01 PROCEDURE — 84132 ASSAY OF SERUM POTASSIUM: CPT | Performed by: INTERNAL MEDICINE

## 2021-01-01 PROCEDURE — 250N000013 HC RX MED GY IP 250 OP 250 PS 637: Performed by: HOSPITALIST

## 2021-01-01 PROCEDURE — 83605 ASSAY OF LACTIC ACID: CPT | Performed by: INTERNAL MEDICINE

## 2021-01-01 PROCEDURE — 81001 URINALYSIS AUTO W/SCOPE: CPT | Performed by: EMERGENCY MEDICINE

## 2021-01-01 PROCEDURE — 84132 ASSAY OF SERUM POTASSIUM: CPT | Performed by: HOSPITALIST

## 2021-01-01 PROCEDURE — 82803 BLOOD GASES ANY COMBINATION: CPT | Performed by: INTERNAL MEDICINE

## 2021-01-01 PROCEDURE — 84145 PROCALCITONIN (PCT): CPT | Performed by: EMERGENCY MEDICINE

## 2021-01-01 PROCEDURE — 96367 TX/PROPH/DG ADDL SEQ IV INF: CPT

## 2021-01-01 PROCEDURE — 82565 ASSAY OF CREATININE: CPT | Performed by: HOSPITALIST

## 2021-01-01 PROCEDURE — 71045 X-RAY EXAM CHEST 1 VIEW: CPT | Mod: 59

## 2021-01-01 PROCEDURE — 86141 C-REACTIVE PROTEIN HS: CPT | Performed by: EMERGENCY MEDICINE

## 2021-01-01 PROCEDURE — 92610 EVALUATE SWALLOWING FUNCTION: CPT | Mod: GN

## 2021-01-01 RX ORDER — CALCIUM CARBONATE 500 MG/1
500 TABLET, CHEWABLE ORAL DAILY
Status: DISCONTINUED | OUTPATIENT
Start: 2021-01-01 | End: 2021-01-01 | Stop reason: HOSPADM

## 2021-01-01 RX ORDER — SODIUM CHLORIDE 9 MG/ML
INJECTION, SOLUTION INTRAVENOUS CONTINUOUS
Status: DISCONTINUED | OUTPATIENT
Start: 2021-01-01 | End: 2021-01-01

## 2021-01-01 RX ORDER — BENZONATATE 100 MG/1
100 CAPSULE ORAL 3 TIMES DAILY PRN
Status: DISCONTINUED | OUTPATIENT
Start: 2021-01-01 | End: 2021-01-01 | Stop reason: HOSPADM

## 2021-01-01 RX ORDER — SODIUM CHLORIDE 9 MG/ML
INJECTION, SOLUTION INTRAVENOUS CONTINUOUS
Status: DISCONTINUED | OUTPATIENT
Start: 2021-01-01 | End: 2021-01-01 | Stop reason: HOSPADM

## 2021-01-01 RX ORDER — PANTOPRAZOLE SODIUM 40 MG/1
40 TABLET, DELAYED RELEASE ORAL
Qty: 30 TABLET | Refills: 0 | Status: SHIPPED | OUTPATIENT
Start: 2021-01-01

## 2021-01-01 RX ORDER — DIVALPROEX SODIUM 125 MG/1
500 CAPSULE, COATED PELLETS ORAL AT BEDTIME
Status: DISCONTINUED | OUTPATIENT
Start: 2021-01-01 | End: 2021-01-01 | Stop reason: HOSPADM

## 2021-01-01 RX ORDER — PIPERACILLIN SODIUM, TAZOBACTAM SODIUM 3; .375 G/15ML; G/15ML
3.38 INJECTION, POWDER, LYOPHILIZED, FOR SOLUTION INTRAVENOUS ONCE
Status: COMPLETED | OUTPATIENT
Start: 2021-01-01 | End: 2021-01-01

## 2021-01-01 RX ORDER — ONDANSETRON 2 MG/ML
4 INJECTION INTRAMUSCULAR; INTRAVENOUS EVERY 6 HOURS PRN
Status: DISCONTINUED | OUTPATIENT
Start: 2021-01-01 | End: 2021-01-01 | Stop reason: HOSPADM

## 2021-01-01 RX ORDER — DIPHENHYDRAMINE HCL 25 MG
25 CAPSULE ORAL
Status: DISCONTINUED | OUTPATIENT
Start: 2021-01-01 | End: 2021-01-01 | Stop reason: HOSPADM

## 2021-01-01 RX ORDER — CETIRIZINE HYDROCHLORIDE 5 MG/1
10 TABLET ORAL DAILY
COMMUNITY

## 2021-01-01 RX ORDER — IOPAMIDOL 755 MG/ML
79 INJECTION, SOLUTION INTRAVASCULAR ONCE
Status: COMPLETED | OUTPATIENT
Start: 2021-01-01 | End: 2021-01-01

## 2021-01-01 RX ORDER — PANTOPRAZOLE SODIUM 40 MG/1
40 TABLET, DELAYED RELEASE ORAL
Status: DISCONTINUED | OUTPATIENT
Start: 2021-01-01 | End: 2021-01-01 | Stop reason: HOSPADM

## 2021-01-01 RX ORDER — ACETAMINOPHEN 500 MG
1000 TABLET ORAL PRN
COMMUNITY
End: 2021-01-01

## 2021-01-01 RX ORDER — POTASSIUM CHLORIDE 7.45 MG/ML
10 INJECTION INTRAVENOUS
Status: COMPLETED | OUTPATIENT
Start: 2021-01-01 | End: 2021-01-01

## 2021-01-01 RX ORDER — LEVETIRACETAM 250 MG/1
500 TABLET ORAL 2 TIMES DAILY
Qty: 120 TABLET | Refills: 11 | Status: SHIPPED | OUTPATIENT
Start: 2021-01-01

## 2021-01-01 RX ORDER — DEXTROSE MONOHYDRATE 25 G/50ML
25-50 INJECTION, SOLUTION INTRAVENOUS
Status: DISCONTINUED | OUTPATIENT
Start: 2021-01-01 | End: 2021-01-01 | Stop reason: HOSPADM

## 2021-01-01 RX ORDER — PANTOPRAZOLE SODIUM 40 MG/1
40 TABLET, DELAYED RELEASE ORAL
Qty: 30 TABLET | Refills: 0 | Status: SHIPPED | OUTPATIENT
Start: 2021-01-01 | End: 2021-01-01

## 2021-01-01 RX ORDER — BENZONATATE 100 MG/1
100 CAPSULE ORAL 3 TIMES DAILY PRN
COMMUNITY

## 2021-01-01 RX ORDER — ACETAMINOPHEN 650 MG/1
650 SUPPOSITORY RECTAL ONCE
Status: COMPLETED | OUTPATIENT
Start: 2021-01-01 | End: 2021-01-01

## 2021-01-01 RX ORDER — LEVOFLOXACIN 750 MG/1
750 TABLET, FILM COATED ORAL DAILY
Qty: 2 TABLET | Refills: 0 | Status: SHIPPED | OUTPATIENT
Start: 2021-01-01 | End: 2021-01-01

## 2021-01-01 RX ORDER — CETIRIZINE HYDROCHLORIDE 5 MG/1
10 TABLET ORAL DAILY
Status: DISCONTINUED | OUTPATIENT
Start: 2021-01-01 | End: 2021-01-01

## 2021-01-01 RX ORDER — NICOTINE POLACRILEX 4 MG
15-30 LOZENGE BUCCAL
Status: DISCONTINUED | OUTPATIENT
Start: 2021-01-01 | End: 2021-01-01 | Stop reason: HOSPADM

## 2021-01-01 RX ORDER — LEVETIRACETAM 250 MG/1
500 TABLET ORAL 2 TIMES DAILY
COMMUNITY
Start: 2019-12-30 | End: 2021-01-01

## 2021-01-01 RX ORDER — NOREPINEPHRINE BITARTRATE 0.02 MG/ML
.01-.6 INJECTION, SOLUTION INTRAVENOUS CONTINUOUS
Status: DISCONTINUED | OUTPATIENT
Start: 2021-01-01 | End: 2021-01-01

## 2021-01-01 RX ORDER — POTASSIUM CHLORIDE 1500 MG/1
20 TABLET, EXTENDED RELEASE ORAL ONCE
Status: COMPLETED | OUTPATIENT
Start: 2021-01-01 | End: 2021-01-01

## 2021-01-01 RX ORDER — LANOLIN ALCOHOL/MO/W.PET/CERES
3 CREAM (GRAM) TOPICAL AT BEDTIME
COMMUNITY

## 2021-01-01 RX ORDER — DIVALPROEX SODIUM 125 MG/1
250 CAPSULE, COATED PELLETS ORAL EVERY MORNING
COMMUNITY
Start: 2021-01-01

## 2021-01-01 RX ORDER — OXCARBAZEPINE 300 MG/1
300 TABLET, FILM COATED ORAL 2 TIMES DAILY
Qty: 60 TABLET | Refills: 4 | Status: SHIPPED | OUTPATIENT
Start: 2021-01-01 | End: 2021-01-01

## 2021-01-01 RX ORDER — DIVALPROEX SODIUM 125 MG/1
250 CAPSULE, COATED PELLETS ORAL EVERY MORNING
Status: DISCONTINUED | OUTPATIENT
Start: 2021-01-01 | End: 2021-01-01 | Stop reason: HOSPADM

## 2021-01-01 RX ORDER — ESCITALOPRAM OXALATE 5 MG/1
5 TABLET ORAL DAILY
Status: DISCONTINUED | OUTPATIENT
Start: 2021-01-01 | End: 2021-01-01 | Stop reason: HOSPADM

## 2021-01-01 RX ORDER — DIPHENHYDRAMINE HCL 25 MG
25 CAPSULE ORAL
COMMUNITY

## 2021-01-01 RX ORDER — DIVALPROEX SODIUM 125 MG/1
250 CAPSULE, COATED PELLETS ORAL 2 TIMES DAILY
Qty: 120 CAPSULE | Refills: 11 | Status: SHIPPED | OUTPATIENT
Start: 2021-01-01 | End: 2021-01-01

## 2021-01-01 RX ORDER — ONDANSETRON 4 MG/1
4 TABLET, ORALLY DISINTEGRATING ORAL EVERY 6 HOURS PRN
Status: DISCONTINUED | OUTPATIENT
Start: 2021-01-01 | End: 2021-01-01 | Stop reason: HOSPADM

## 2021-01-01 RX ORDER — VANCOMYCIN HYDROCHLORIDE 500 MG/10ML
500 INJECTION, POWDER, LYOPHILIZED, FOR SOLUTION INTRAVENOUS EVERY 12 HOURS
Status: DISCONTINUED | OUTPATIENT
Start: 2021-01-01 | End: 2021-01-01

## 2021-01-01 RX ORDER — ESCITALOPRAM OXALATE 5 MG/1
5 TABLET ORAL DAILY
COMMUNITY

## 2021-01-01 RX ORDER — CALCIUM CARBONATE 500 MG/1
1 TABLET, CHEWABLE ORAL DAILY
COMMUNITY

## 2021-01-01 RX ORDER — LORAZEPAM 0.5 MG/1
.5-1 TABLET ORAL PRN
COMMUNITY

## 2021-01-01 RX ORDER — LEVETIRACETAM 5 MG/ML
500 INJECTION INTRAVASCULAR EVERY 12 HOURS
Status: DISCONTINUED | OUTPATIENT
Start: 2021-01-01 | End: 2021-01-01

## 2021-01-01 RX ORDER — CETIRIZINE HYDROCHLORIDE 5 MG/1
10 TABLET ORAL DAILY
Status: DISCONTINUED | OUTPATIENT
Start: 2021-01-01 | End: 2021-01-01 | Stop reason: HOSPADM

## 2021-01-01 RX ORDER — DIVALPROEX SODIUM 125 MG/1
500 CAPSULE, COATED PELLETS ORAL AT BEDTIME
COMMUNITY

## 2021-01-01 RX ORDER — LIDOCAINE 40 MG/G
CREAM TOPICAL
Status: DISCONTINUED | OUTPATIENT
Start: 2021-01-01 | End: 2021-01-01 | Stop reason: CLARIF

## 2021-01-01 RX ORDER — LEVETIRACETAM 250 MG/1
500 TABLET ORAL 2 TIMES DAILY
Status: DISCONTINUED | OUTPATIENT
Start: 2021-01-01 | End: 2021-01-01 | Stop reason: HOSPADM

## 2021-01-01 RX ADMIN — DEXTROSE MONOHYDRATE 25 ML: 25 INJECTION, SOLUTION INTRAVENOUS at 08:30

## 2021-01-01 RX ADMIN — DIVALPROEX SODIUM 250 MG: 125 CAPSULE, COATED PELLETS ORAL at 09:35

## 2021-01-01 RX ADMIN — POTASSIUM CHLORIDE 10 MEQ: 7.46 INJECTION, SOLUTION INTRAVENOUS at 21:43

## 2021-01-01 RX ADMIN — CETIRIZINE HYDROCHLORIDE 10 MG: 1 SOLUTION ORAL at 15:04

## 2021-01-01 RX ADMIN — LEVETIRACETAM 500 MG: 250 TABLET, FILM COATED ORAL at 21:45

## 2021-01-01 RX ADMIN — LEVETIRACETAM 500 MG: 250 TABLET, FILM COATED ORAL at 09:35

## 2021-01-01 RX ADMIN — NOREPINEPHRINE BITARTRATE 4 MG/250 ML (16 MCG/ML) IN 0.9 % NACL IV 0.03 MCG/KG/MIN: at 02:35

## 2021-01-01 RX ADMIN — VANCOMYCIN HYDROCHLORIDE 750 MG: 1 INJECTION, POWDER, LYOPHILIZED, FOR SOLUTION INTRAVENOUS at 16:35

## 2021-01-01 RX ADMIN — LEVETIRACETAM 500 MG: 250 TABLET, FILM COATED ORAL at 09:42

## 2021-01-01 RX ADMIN — TAZOBACTAM SODIUM AND PIPERACILLIN SODIUM 3.38 G: 375; 3 INJECTION, SOLUTION INTRAVENOUS at 02:54

## 2021-01-01 RX ADMIN — TAZOBACTAM SODIUM AND PIPERACILLIN SODIUM 3.38 G: 375; 3 INJECTION, SOLUTION INTRAVENOUS at 15:24

## 2021-01-01 RX ADMIN — TAZOBACTAM SODIUM AND PIPERACILLIN SODIUM 3.38 G: 375; 3 INJECTION, SOLUTION INTRAVENOUS at 15:45

## 2021-01-01 RX ADMIN — ESCITALOPRAM 5 MG: 5 TABLET, FILM COATED ORAL at 09:35

## 2021-01-01 RX ADMIN — POTASSIUM CHLORIDE 10 MEQ: 7.46 INJECTION, SOLUTION INTRAVENOUS at 20:32

## 2021-01-01 RX ADMIN — ENOXAPARIN SODIUM 30 MG: 30 INJECTION SUBCUTANEOUS at 05:06

## 2021-01-01 RX ADMIN — POTASSIUM CHLORIDE 10 MEQ: 7.46 INJECTION, SOLUTION INTRAVENOUS at 18:41

## 2021-01-01 RX ADMIN — PANTOPRAZOLE SODIUM 40 MG: 40 INJECTION, POWDER, FOR SOLUTION INTRAVENOUS at 09:40

## 2021-01-01 RX ADMIN — TAZOBACTAM SODIUM AND PIPERACILLIN SODIUM 3.38 G: 375; 3 INJECTION, SOLUTION INTRAVENOUS at 19:53

## 2021-01-01 RX ADMIN — DEXTROSE MONOHYDRATE 25 ML: 25 INJECTION, SOLUTION INTRAVENOUS at 16:30

## 2021-01-01 RX ADMIN — TAZOBACTAM SODIUM AND PIPERACILLIN SODIUM 3.38 G: 375; 3 INJECTION, SOLUTION INTRAVENOUS at 20:55

## 2021-01-01 RX ADMIN — TAZOBACTAM SODIUM AND PIPERACILLIN SODIUM 3.38 G: 375; 3 INJECTION, SOLUTION INTRAVENOUS at 20:21

## 2021-01-01 RX ADMIN — CALCIUM CARBONATE (ANTACID) CHEW TAB 500 MG 500 MG: 500 CHEW TAB at 09:35

## 2021-01-01 RX ADMIN — LEVETIRACETAM 500 MG: 5 INJECTION INTRAVENOUS at 01:36

## 2021-01-01 RX ADMIN — PIPERACILLIN SODIUM AND TAZOBACTAM SODIUM 3.38 G: 3; .375 INJECTION, POWDER, LYOPHILIZED, FOR SOLUTION INTRAVENOUS at 15:59

## 2021-01-01 RX ADMIN — SODIUM CHLORIDE 1000 ML: 9 INJECTION, SOLUTION INTRAVENOUS at 15:22

## 2021-01-01 RX ADMIN — CETIRIZINE HYDROCHLORIDE 10 MG: 1 SOLUTION ORAL at 10:02

## 2021-01-01 RX ADMIN — VANCOMYCIN HYDROCHLORIDE 500 MG: 500 INJECTION, POWDER, LYOPHILIZED, FOR SOLUTION INTRAVENOUS at 03:19

## 2021-01-01 RX ADMIN — CALCIUM CARBONATE (ANTACID) CHEW TAB 500 MG 500 MG: 500 CHEW TAB at 09:42

## 2021-01-01 RX ADMIN — DIVALPROEX SODIUM 250 MG: 125 CAPSULE, COATED PELLETS ORAL at 14:46

## 2021-01-01 RX ADMIN — SODIUM CHLORIDE: 9 INJECTION, SOLUTION INTRAVENOUS at 01:36

## 2021-01-01 RX ADMIN — LIDOCAINE HYDROCHLORIDE 2 ML: 10 INJECTION, SOLUTION EPIDURAL; INFILTRATION; INTRACAUDAL; PERINEURAL at 19:18

## 2021-01-01 RX ADMIN — ENOXAPARIN SODIUM 30 MG: 30 INJECTION SUBCUTANEOUS at 03:17

## 2021-01-01 RX ADMIN — ESCITALOPRAM 5 MG: 5 TABLET, FILM COATED ORAL at 14:46

## 2021-01-01 RX ADMIN — ENOXAPARIN SODIUM 30 MG: 30 INJECTION SUBCUTANEOUS at 03:59

## 2021-01-01 RX ADMIN — POTASSIUM CHLORIDE 10 MEQ: 7.46 INJECTION, SOLUTION INTRAVENOUS at 22:52

## 2021-01-01 RX ADMIN — CETIRIZINE HYDROCHLORIDE 10 MG: 1 SOLUTION ORAL at 09:55

## 2021-01-01 RX ADMIN — AMOXICILLIN AND CLAVULANATE POTASSIUM 1 TABLET: 875; 125 TABLET, FILM COATED ORAL at 10:02

## 2021-01-01 RX ADMIN — TAZOBACTAM SODIUM AND PIPERACILLIN SODIUM 3.38 G: 375; 3 INJECTION, SOLUTION INTRAVENOUS at 02:04

## 2021-01-01 RX ADMIN — SODIUM CHLORIDE: 9 INJECTION, SOLUTION INTRAVENOUS at 22:46

## 2021-01-01 RX ADMIN — TAZOBACTAM SODIUM AND PIPERACILLIN SODIUM 4.5 G: 500; 4 INJECTION, SOLUTION INTRAVENOUS at 01:47

## 2021-01-01 RX ADMIN — IOPAMIDOL 79 ML: 755 INJECTION, SOLUTION INTRAVENOUS at 14:50

## 2021-01-01 RX ADMIN — DEXTROSE AND SODIUM CHLORIDE: 5; 450 INJECTION, SOLUTION INTRAVENOUS at 03:01

## 2021-01-01 RX ADMIN — SODIUM CHLORIDE: 9 INJECTION, SOLUTION INTRAVENOUS at 16:38

## 2021-01-01 RX ADMIN — TAZOBACTAM SODIUM AND PIPERACILLIN SODIUM 3.38 G: 375; 3 INJECTION, SOLUTION INTRAVENOUS at 14:51

## 2021-01-01 RX ADMIN — TAZOBACTAM SODIUM AND PIPERACILLIN SODIUM 3.38 G: 375; 3 INJECTION, SOLUTION INTRAVENOUS at 09:40

## 2021-01-01 RX ADMIN — LEVETIRACETAM 500 MG: 5 INJECTION INTRAVENOUS at 23:37

## 2021-01-01 RX ADMIN — DEXTROSE MONOHYDRATE 25 ML: 25 INJECTION, SOLUTION INTRAVENOUS at 00:06

## 2021-01-01 RX ADMIN — LEVETIRACETAM 500 MG: 250 TABLET, FILM COATED ORAL at 14:47

## 2021-01-01 RX ADMIN — SODIUM CHLORIDE: 9 INJECTION, SOLUTION INTRAVENOUS at 23:33

## 2021-01-01 RX ADMIN — DIVALPROEX SODIUM 500 MG: 125 CAPSULE, COATED PELLETS ORAL at 21:48

## 2021-01-01 RX ADMIN — DIVALPROEX SODIUM 500 MG: 125 CAPSULE, COATED PELLETS ORAL at 21:45

## 2021-01-01 RX ADMIN — DIVALPROEX SODIUM 250 MG: 125 CAPSULE, COATED PELLETS ORAL at 09:42

## 2021-01-01 RX ADMIN — POTASSIUM CHLORIDE 20 MEQ: 1500 TABLET, EXTENDED RELEASE ORAL at 09:35

## 2021-01-01 RX ADMIN — TAZOBACTAM SODIUM AND PIPERACILLIN SODIUM 3.38 G: 375; 3 INJECTION, SOLUTION INTRAVENOUS at 09:56

## 2021-01-01 RX ADMIN — CALCIUM CARBONATE (ANTACID) CHEW TAB 500 MG 500 MG: 500 CHEW TAB at 15:01

## 2021-01-01 RX ADMIN — DEXTROSE AND SODIUM CHLORIDE: 5; 450 INJECTION, SOLUTION INTRAVENOUS at 03:14

## 2021-01-01 RX ADMIN — PANTOPRAZOLE SODIUM 40 MG: 40 TABLET, DELAYED RELEASE ORAL at 09:35

## 2021-01-01 RX ADMIN — PANTOPRAZOLE SODIUM 40 MG: 40 TABLET, DELAYED RELEASE ORAL at 06:37

## 2021-01-01 RX ADMIN — ENOXAPARIN SODIUM 40 MG: 40 INJECTION SUBCUTANEOUS at 01:58

## 2021-01-01 RX ADMIN — TAZOBACTAM SODIUM AND PIPERACILLIN SODIUM 4.5 G: 500; 4 INJECTION, SOLUTION INTRAVENOUS at 08:32

## 2021-01-01 RX ADMIN — TAZOBACTAM SODIUM AND PIPERACILLIN SODIUM 3.38 G: 375; 3 INJECTION, SOLUTION INTRAVENOUS at 01:42

## 2021-01-01 RX ADMIN — ESCITALOPRAM 5 MG: 5 TABLET, FILM COATED ORAL at 09:42

## 2021-01-01 RX ADMIN — ACETAMINOPHEN 650 MG: 650 SUPPOSITORY RECTAL at 13:44

## 2021-01-01 RX ADMIN — LEVETIRACETAM 500 MG: 5 INJECTION INTRAVENOUS at 12:44

## 2021-01-01 RX ADMIN — SODIUM CHLORIDE 1000 ML: 9 INJECTION, SOLUTION INTRAVENOUS at 13:44

## 2021-01-01 RX ADMIN — VANCOMYCIN HYDROCHLORIDE 500 MG: 500 INJECTION, POWDER, LYOPHILIZED, FOR SOLUTION INTRAVENOUS at 04:54

## 2021-01-01 RX ADMIN — LEVETIRACETAM 500 MG: 250 TABLET, FILM COATED ORAL at 22:27

## 2021-01-01 RX ADMIN — PANTOPRAZOLE SODIUM 40 MG: 40 INJECTION, POWDER, FOR SOLUTION INTRAVENOUS at 08:31

## 2021-01-01 RX ADMIN — VANCOMYCIN HYDROCHLORIDE 500 MG: 500 INJECTION, POWDER, LYOPHILIZED, FOR SOLUTION INTRAVENOUS at 16:48

## 2021-01-01 ASSESSMENT — ACTIVITIES OF DAILY LIVING (ADL)
ADLS_ACUITY_SCORE: 34
ADLS_ACUITY_SCORE: 34
DIFFICULTY_COMMUNICATING: YES
ADLS_ACUITY_SCORE: 34
TOILETING_ASSISTANCE: TOILETING DIFFICULTY, DEPENDENT
ADLS_ACUITY_SCORE: 34
HEARING_DIFFICULTY_OR_DEAF: OTHER (SEE COMMENTS)
COMMUNICATION: DIFFICULTY SPEAKING;DIFFICULTY UNDERSTANDING
ADLS_ACUITY_SCORE: 36
DOING_ERRANDS_INDEPENDENTLY_DIFFICULTY: YES
WHICH_OF_THE_ABOVE_FUNCTIONAL_RISKS_HAD_A_RECENT_ONSET_OR_CHANGE?: EATING
ADLS_ACUITY_SCORE: 32
TOILETING_MANAGEMENT: BRIEFS
ADLS_ACUITY_SCORE: 33
ADLS_ACUITY_SCORE: 34
ADLS_ACUITY_SCORE: 30
ADLS_ACUITY_SCORE: 34
DIFFICULTY_EATING/SWALLOWING: OTHER (SEE COMMENTS)
TOILETING_ISSUES: YES
ADLS_ACUITY_SCORE: 34
WALKING_OR_CLIMBING_STAIRS_DIFFICULTY: OTHER (SEE COMMENTS)
ADLS_ACUITY_SCORE: 34
ADLS_ACUITY_SCORE: 34
ADLS_ACUITY_SCORE: 18
CONCENTRATING,_REMEMBERING_OR_MAKING_DECISIONS_DIFFICULTY: YES
DRESSING/BATHING_DIFFICULTY: OTHER (SEE COMMENTS)
ADLS_ACUITY_SCORE: 30
PATIENT_/_FAMILY_COMMUNICATION_STYLE: SPOKEN LANGUAGE (ENGLISH OR BILINGUAL)
ADLS_ACUITY_SCORE: 32
WALKING_OR_CLIMBING_STAIRS: OTHER (SEE COMMENTS)
ADLS_ACUITY_SCORE: 34
WEAR_GLASSES_OR_BLIND: OTHER (SEE COMMENTS)
EQUIPMENT_CURRENTLY_USED_AT_HOME: OTHER (SEE COMMENTS)
ADLS_ACUITY_SCORE: 34

## 2021-01-01 ASSESSMENT — MIFFLIN-ST. JEOR
SCORE: 750.5
SCORE: 737.84
SCORE: 759.59
SCORE: 742.84
SCORE: 729.23

## 2021-02-03 PROBLEM — R94.31 NONSPECIFIC ABNORMAL ELECTROCARDIOGRAM (ECG) (EKG): Status: ACTIVE | Noted: 2019-06-17

## 2021-02-03 PROBLEM — I95.9 HYPOTENSION, UNSPECIFIED HYPOTENSION TYPE: Status: ACTIVE | Noted: 2019-06-17

## 2021-02-03 PROBLEM — R40.4 UNRESPONSIVE EPISODE: Status: ACTIVE | Noted: 2019-06-17

## 2021-02-03 PROBLEM — R45.89 DEPRESSED MOOD: Status: ACTIVE | Noted: 2021-01-01

## 2021-02-03 PROBLEM — R40.20 LOSS OF CONSCIOUSNESS (H): Status: ACTIVE | Noted: 2021-01-01

## 2021-02-03 PROBLEM — J12.82 PNEUMONIA DUE TO 2019 NOVEL CORONAVIRUS: Status: ACTIVE | Noted: 2020-01-01

## 2021-02-03 PROBLEM — J18.9 COMMUNITY ACQUIRED PNEUMONIA: Status: ACTIVE | Noted: 2019-06-17

## 2021-02-03 PROBLEM — R53.1 WEAKNESS: Status: ACTIVE | Noted: 2020-01-01

## 2021-02-03 PROBLEM — R56.9 SEIZURE (H): Status: ACTIVE | Noted: 2020-01-01

## 2021-02-03 PROBLEM — U07.1 RASH ASSOCIATED WITH COVID-19: Status: ACTIVE | Noted: 2020-01-01

## 2021-02-03 PROBLEM — F03.90 DEMENTIA (H): Status: ACTIVE | Noted: 2019-06-17

## 2021-02-03 PROBLEM — R21 RASH ASSOCIATED WITH COVID-19: Status: ACTIVE | Noted: 2020-01-01

## 2021-02-03 PROBLEM — U07.1 PNEUMONIA DUE TO 2019 NOVEL CORONAVIRUS: Status: ACTIVE | Noted: 2020-01-01

## 2021-02-03 PROBLEM — Q90.9 DOWN SYNDROME, UNSPECIFIED: Status: ACTIVE | Noted: 2019-06-17

## 2021-02-03 PROBLEM — R79.89 ELEVATED PROLACTIN LEVEL: Status: ACTIVE | Noted: 2019-06-18

## 2021-02-04 NOTE — LETTER
2/4/2021         RE: Malinda Walker  [address] [address]        Dear Colleague,    Thank you for referring your patient, Malinda Walker, to the [facility] [place]. Please see a copy of my visit note below.    [facility]  [place]    Malinda Walker MRN# [id number]   Age: 55 year old YOB: 1965               Assessment and Plan:   Assessment:   Seizures  May be having side effect of imbalance with therapeutic dose of levetiracetam        Plan:     We will gradually switch over to oxcarbazepine.  I will type out a schedule to do that.  I would like to see Malinda back in 2 months so that I can ask how she is doing.  At that time we should do blood work to check an oxcarbazepine level and a sodium level.             Chief Complaint/HPI:     I saw Malinda today accompanied by one of the caregivers from her group home via video visit.  I last saw her in September 2019.  In late December 2020 she had an episode of seizure activity, ER notes indicate shaking in all 4 extremities with postictal drowsiness.  She was brought to [facility].  Her Keppra level was low at 3.2.  The dose was increased to 500 mg twice a day.  Since that increase the caregiver says that Malinda as being very weak, this is described as falling forwards or backwards when she is walking.  Previously we had tried lamotrigine but she developed rash diffusely and was switched back to Keppra.  When I first met her in June 2019 she had been to [facility] after being found in bed with labored breathing and again an apparent post ictal state.  EEG was attempted but she could not tolerate application of the wires.  CT of the brain showed nothing acute but some atrophy as expected.  Then, on June 23 of 2019 she was standing in the kitchen at the group home when she suddenly fell to the floor with shaking all over.            Past Medical History:    has a past  medical history of Down's syndrome and Seizures (H).          Past Surgical History:    has a past surgical history that includes no history of surgery.          Social History:     Social History     Tobacco Use     Smoking status: Never Smoker     Smokeless tobacco: Never Used   Substance Use Topics     Alcohol use: No             Family History:     Family History   Family history unknown: Yes                Allergies:   No Known Allergies          Medications:     Current Outpatient Medications:      acetaminophen (TYLENOL) 500 MG tablet, Take 1,000 mg by mouth as needed, Disp: , Rfl:      calcium carbonate (TUMS) 500 MG chewable tablet, Take 1 tablet (500 mg) by mouth 2 times daily, Disp: 150 tablet, Rfl: 0     cetirizine (ZYRTEC) 5 MG/5ML solution, Take 10 mLs by mouth daily, Disp: , Rfl:      escitalopram (LEXAPRO) 5 MG tablet, Take 5 mg by mouth daily, Disp: , Rfl:      levETIRAcetam (KEPPRA) 250 MG tablet, Take 500 mg by mouth 2 times daily, Disp: , Rfl:      melatonin 3 MG tablet, Take 3 mg by mouth At Bedtime, Disp: , Rfl:      miconazole (MICATIN) 2 % external powder, , Disp: , Rfl:               Physical Exam:   Awake and alert   She is nonverbal  She does not engage with me on the video screen but is active and bouncing around the room a bit, quite awake and in good spirits  Appearance consistent with Down syndrome  No lateralized weakness as she moves about  Formal testing is really not feasible here.         Video-Visit Details    Type of service:  Video Visit    Video Start Time: 9:02 AM  Video End Time: 9:15 AM    Originating Location (pt. Location): Home  Distant Location (provider location):  [facility] NEUROLOG[facility] [place]   Platform used for Video Visit: [facility]            Felton Elkins MD            Again, thank you for allowing me to participate in the care of your patient.        Sincerely,        Felton Elkins MD

## 2021-02-04 NOTE — NURSING NOTE
Chief Complaint   Patient presents with     Follow Up     Annual follow up-her care staff would like to discuss the medication changes that happened after her Hospital visit, also asking for a seizure protocol      AW touch point Smart phone [phone] -Мария Kelsey CMA on 2/4/2021 at 8:39 AM

## 2021-02-04 NOTE — PATIENT INSTRUCTIONS
Medication instructions:    1.  Start oxcarbazepine 300 mg twice daily    2.  After 5 days decrease levetiracetam to 250 mg twice a day    3.  After a week, decrease levetiracetam to 250 mg once a day    4.  After another week stop levetiracetam and continue just on oxcarbazepine.

## 2021-02-04 NOTE — PROGRESS NOTES
[facility] Neurology  [place]    Malinda Walker MRN# [id number]   Age: 55 year old YOB: 1965               Assessment and Plan:   Assessment:   Seizures  May be having side effect of imbalance with therapeutic dose of levetiracetam        Plan:     We will gradually switch over to oxcarbazepine.  I will type out a schedule to do that.  I would like to see Malinda back in 2 months so that I can ask how she is doing.  At that time we should do blood work to check an oxcarbazepine level and a sodium level.             Chief Complaint/HPI:     I saw Malinda today accompanied by one of the caregivers from her group home via video visit.  I last saw her in September 2019.  In late December 2020 she had an episode of seizure activity, ER notes indicate shaking in all 4 extremities with postictal drowsiness.  She was brought to [facility].  Her Keppra level was low at 3.2.  The dose was increased to 500 mg twice a day.  Since that increase the caregiver says that Malinda as being very weak, this is described as falling forwards or backwards when she is walking.  Previously we had tried lamotrigine but she developed rash diffusely and was switched back to Keppra.  When I first met her in June 2019 she had been to [facility] after being found in bed with labored breathing and again an apparent post ictal state.  EEG was attempted but she could not tolerate application of the wires.  CT of the brain showed nothing acute but some atrophy as expected.  Then, on June 23 of 2019 she was standing in the kitchen at the group home when she suddenly fell to the floor with shaking all over.            Past Medical History:    has a past medical history of Down's syndrome and Seizures (H).          Past Surgical History:    has a past surgical history that includes no history of surgery.          Social History:     Social History     Tobacco Use     Smoking status: Never Smoker     Smokeless  tobacco: Never Used   Substance Use Topics     Alcohol use: No             Family History:     Family History   Family history unknown: Yes                Allergies:   No Known Allergies          Medications:     Current Outpatient Medications:      acetaminophen (TYLENOL) 500 MG tablet, Take 1,000 mg by mouth as needed, Disp: , Rfl:      calcium carbonate (TUMS) 500 MG chewable tablet, Take 1 tablet (500 mg) by mouth 2 times daily, Disp: 150 tablet, Rfl: 0     cetirizine (ZYRTEC) 5 MG/5ML solution, Take 10 mLs by mouth daily, Disp: , Rfl:      escitalopram (LEXAPRO) 5 MG tablet, Take 5 mg by mouth daily, Disp: , Rfl:      levETIRAcetam (KEPPRA) 250 MG tablet, Take 500 mg by mouth 2 times daily, Disp: , Rfl:      melatonin 3 MG tablet, Take 3 mg by mouth At Bedtime, Disp: , Rfl:      miconazole (MICATIN) 2 % external powder, , Disp: , Rfl:               Physical Exam:   Awake and alert   She is nonverbal  She does not engage with me on the video screen but is active and bouncing around the room a bit, quite awake and in good spirits  Appearance consistent with Down syndrome  No lateralized weakness as she moves about  Formal testing is really not feasible here.         Video-Visit Details    Type of service:  Video Visit    Video Start Time: 9:02 AM  Video End Time: 9:15 AM    Originating Location (pt. Location): Home  Distant Location (provider location):  [facility] NEUROLOGY [place]   Platform used for Video Visit: Harjit Elkins MD

## 2021-03-31 NOTE — PROGRESS NOTES
[facility] Neurology  [place]    Malinda Walker MRN# [id number]   Age: 55 year old YOB: 1965               Assessment and Plan:   Assessment:   Seizure disorder  Medication side effects        Plan:     For now we will continue the same doses of levetiracetam and divalproex.  Staff will continue to try to encourage mobility and physical movement.  I had like to see Malinda back in 3 months to see how she is doing.             Chief Complaint/HPI:     I saw Malinda for a video visit today accompanied by one of the caregivers from her group home.  At our last visit in February concern was raised for side effects due to levetiracetam.  With that I made a plan to switch over to oxcarbazepine but she had shaking all over, this was described as myoclonus when she was seen by the epileptologist at [facility].  The myoclonus resolved nicely with discontinuing the oxcarbazepine.  She had EEG monitoring that did not show any seizure, though did clinically have a seizure on the last day of the hospitalization (unmonitored).  This was thought due to low levetiracetam level, and her dose was continued at 500 mg twice daily.  Divalproex was also added.  Currently she is doing okay.  She has not been ambulating since she had Covid infection back in September and was [facility].  Here she has been getting physical therapy, though with her cognitive impairment she is not able to participate in therapy very much or remember training.  Staff to try to get her up when she is willing.  Of note, back in 2019 I also tried to switch her off Keppra onto lamotrigine (concern was raised for sedation due to Keppra) but she developed significant rash and was switched back appropriately.            Past Medical History:    has a past medical history of Down's syndrome and Seizures (H).          Past Surgical History:    has a past surgical history that includes no history of surgery.          Social  History:     Social History     Tobacco Use     Smoking status: Never Smoker     Smokeless tobacco: Never Used   Substance Use Topics     Alcohol use: No             Family History:     Family History   Family history unknown: Yes                Allergies:   No Known Allergies          Medications:     Current Outpatient Medications:      acetaminophen (TYLENOL) 500 MG tablet, Take 1,000 mg by mouth as needed, Disp: , Rfl:      calcium carbonate (TUMS) 500 MG chewable tablet, Take 1 tablet (500 mg) by mouth 2 times daily, Disp: 150 tablet, Rfl: 0     divalproex sodium delayed-release (DEPAKOTE SPRINKLE) 125 MG DR capsule, Take 250 mg by mouth 2 times daily, Disp: , Rfl:      escitalopram (LEXAPRO) 5 MG tablet, Take 5 mg by mouth daily, Disp: , Rfl:      levETIRAcetam (KEPPRA) 250 MG tablet, Take 500 mg by mouth 2 times daily, Disp: , Rfl:      LORazepam (ATIVAN) 0.5 MG tablet, as needed, Disp: , Rfl:      melatonin 3 MG tablet, Take 3 mg by mouth At Bedtime, Disp: , Rfl:      cetirizine (ZYRTEC) 5 MG/5ML solution, Take 10 mLs by mouth daily, Disp: , Rfl:      miconazole (MICATIN) 2 % external powder, , Disp: , Rfl:      OXcarbazepine (TRILEPTAL) 300 MG tablet, Take 1 tablet (300 mg) by mouth 2 times daily (Patient not taking: Reported on 3/31/2021), Disp: 60 tablet, Rfl: 4              Physical Exam:   Awake and alert   She is nonverbal  She does not engage with me on the video screen but is sitting upright and looking around, attending to things  Appearance consistent with Down syndrome  No lateralized weakness as she moves about  Formal testing is really not feasible here.         Video-Visit Details    Type of service:  Video Visit    Video Start Time: 8:47 AM   video End Time: 8:58 AM    Originating Location (pt. Location): Home  Distant Location (provider location):  [facility] NEUROLOGY [place]   Platform used for Video Visit: Harjit Elkins MD

## 2021-03-31 NOTE — LETTER
3/31/2021         RE: Malinda Walker  [address] [address]        Dear Colleague,    Thank you for referring your patient, Malinda Walker, to the [facility] NEUROLOGY CLINIC [place]. Please see a copy of my visit note below.    [facility] Neurology  [place]    Malinda Walker MRN# [id number]   Age: 55 year old YOB: 1965               Assessment and Plan:   Assessment:   Seizure disorder  Medication side effects        Plan:     For now we will continue the same doses of levetiracetam and divalproex.  Staff will continue to try to encourage mobility and physical movement.  I had like to see Malinda back in 3 months to see how she is doing.             Chief Complaint/HPI:     I saw Malinda for a video visit today accompanied by one of the caregivers from her group home.  At our last visit in February concern was raised for side effects due to levetiracetam.  With that I made a plan to switch over to oxcarbazepine but she had shaking all over, this was described as myoclonus when she was seen by the epileptologist at [facility].  The myoclonus resolved nicely with discontinuing the oxcarbazepine.  She had EEG monitoring that did not show any seizure, though did clinically have a seizure on the last day of the hospitalization (unmonitored).  This was thought due to low levetiracetam level, and her dose was continued at 500 mg twice daily.  Divalproex was also added.  Currently she is doing okay.  She has not been ambulating since she had Covid infection back in September and was at the [facility] a [facility].  Here she has been getting physical therapy, though with her cognitive impairment she is not able to participate in therapy very much or remember training.  Staff to try to get her up when she is willing.  Of note, back in 2019 I also tried to switch her off Keppra onto lamotrigine (concern was raised for sedation due to Keppra) but she developed  significant rash and was switched back appropriately.            Past Medical History:    has a past medical history of Down's syndrome and Seizures (H).          Past Surgical History:    has a past surgical history that includes no history of surgery.          Social History:     Social History     Tobacco Use     Smoking status: Never Smoker     Smokeless tobacco: Never Used   Substance Use Topics     Alcohol use: No             Family History:     Family History   Family history unknown: Yes                Allergies:   No Known Allergies          Medications:     Current Outpatient Medications:      acetaminophen (TYLENOL) 500 MG tablet, Take 1,000 mg by mouth as needed, Disp: , Rfl:      calcium carbonate (TUMS) 500 MG chewable tablet, Take 1 tablet (500 mg) by mouth 2 times daily, Disp: 150 tablet, Rfl: 0     divalproex sodium delayed-release (DEPAKOTE SPRINKLE) 125 MG DR capsule, Take 250 mg by mouth 2 times daily, Disp: , Rfl:      escitalopram (LEXAPRO) 5 MG tablet, Take 5 mg by mouth daily, Disp: , Rfl:      levETIRAcetam (KEPPRA) 250 MG tablet, Take 500 mg by mouth 2 times daily, Disp: , Rfl:      LORazepam (ATIVAN) 0.5 MG tablet, as needed, Disp: , Rfl:      melatonin 3 MG tablet, Take 3 mg by mouth At Bedtime, Disp: , Rfl:      cetirizine (ZYRTEC) 5 MG/5ML solution, Take 10 mLs by mouth daily, Disp: , Rfl:      miconazole (MICATIN) 2 % external powder, , Disp: , Rfl:      OXcarbazepine (TRILEPTAL) 300 MG tablet, Take 1 tablet (300 mg) by mouth 2 times daily (Patient not taking: Reported on 3/31/2021), Disp: 60 tablet, Rfl: 4              Physical Exam:   Awake and alert   She is nonverbal  She does not engage with me on the video screen but is sitting upright and looking around, attending to things  Appearance consistent with Down syndrome  No lateralized weakness as she moves about  Formal testing is really not feasible here.         Video-Visit Details    Type of service:  Video Visit    Video Start  Time: 8:47 AM   video End Time: 8:58 AM    Originating Location (pt. Location): Home  Distant Location (provider location):  [facility]   Platform used for Video Visit: [facility]           Felton Elkins MD         Again, thank you for allowing me to participate in the care of your patient.        Sincerely,        Felton Elkins MD     9.13

## 2021-03-31 NOTE — NURSING NOTE
Chief Complaint   Patient presents with     Seizures     Мария from [facility] will be assisting with call today. Pt was in the hospital in February for seizures. Oxcarbazepine was discontinued and Keppra was restarted per Мария.     Video Visit     [facility]/Send text link to [phone]     Madelin Callahan LPN on 3/31/2021 at 8:29 AM

## 2021-06-21 NOTE — PROGRESS NOTES
Persons accompanying you (the patient) today: Caregiver    How have you been doing since we saw you last? Please note any concerns.  Pt behavior has been getting worse.    Please list any recent hospitalizations/surgeries/procedures you've had since we saw you last:  None    Have you had any falls since your last visit? No    Do you have any pain today? No

## 2021-06-21 NOTE — PROGRESS NOTES
"  Assessment / Impression   1.  Dementia, likely dementia the also retyped  2.  Down syndrome  3.  Behavioral disturbance secondary to above and in large part related to environmental stress      Plan:   1.  Discontinue adapted work programming  2.  Dementia level programming within the group home setting  3.  Discontinue low-dose Zoloft  4.  Follow-up in 2 months    A long conversation held with the patient care coordinator Kristie and care provider Мария.  This patient with Down syndrome is believed to suffer from dementia the Alzheimer type.  Not seen by this examiner in over 3 years, the patient apparently had been doing well until the past several months during which time there has been increasing behavioral difficulties associated with \"transitions.\"  Most notably, the patient has difficulty getting ready for transport to her adapted work program daily and and coming home from the work program.  Some agitation and difficulty with direction noted during these periods.  At this point in time it would appear most appropriate to make reasonable adjustments in environment before considering the potential role of psychotropic therapies.  With this in mind I will discontinue low-dose Zoloft that was provided to the patient earlier this year for management of behaviors.  Staff have been instructed with respect to an appropriate daily program for the patient and we will observe changes in behavior over the next several months.  Staff are to call or return should there be any worsening of the patient's behaviors.    Total time for evaluation 30 minutes with majority time spent in counseling.  All questions answered.      Subjective:     HPI: Malinda Walker is a 53 y.o. female with above-noted diagnoses who returns after a more than 3-year hiatus.  The patient suffers from Down syndrome and I believe dementia the Alzheimer type.  Declining with respect to cognitive and functional performance, staff have noted that " the patient is having increasing difficulties with her daily routine particularly transitions in routine during the day.  For example, the patient has great difficulty and readying herself and excepting transport to her adapted work program.  Once within the program she has great difficulty with transitioning back to the group home.  Some agitation and resistiveness have been noted during these times that have resulted in gentle adjustments in psychoactive therapies.  Of interest, staff gave the patient a break from her adaptive work program for 1 week during which time behaviors were generally better.  Activities engaged in during the day while away from work included low level stimulus activities that appeared to result in better behavioral control.    The patient presents as a middle-aged female with obvious stigmata of Down syndrome.  Quite happy appearing in the exam room, I noted the patient to be somewhat intrusive with this examiner but capable of being redirected without difficulty.  She has no ability to engage verbally with this examiner and she appears unaware of the nature of this evaluation.  Her cognitive difficulties are quite severe and include both developmental as well as neurodegenerative components.          Review of Systems:          As above        Objective:     Vitals:    10/12/18 1402 10/12/18 1403   BP: 118/67 102/58   Pulse: 96 (!) 106       No results found for this or any previous visit (from the past 24 hour(s)).    Physical Exam: Patient is neatly groomed casually dressed and for the most part up and active within the exam room.  Demonstrating childlike behaviors, I noted to be intrusive with this examiner but in a playful way.  She does except gentle redirection from this examiner as well as from her care providers.  No evidence of significant agitation or irritability.  No evidence of fearfulness or significant anxiety at this time.  No stereotype behaviors.  No  posturing.

## 2021-06-23 NOTE — PROGRESS NOTES
Assessment / Impression   1.  Dementia the Alzheimer type  2.  Down syndrome  3.  History of behavioral difficulties in large part secondary to environmental factors      Plan:   1.  I am encouraging that the group home rescind the patient's treatment goal that she participate in 75% of group outings and substitute instead a treatment goal of encouraging participation as I suspect that the environmental disruption incurred during the course of outings may be too costly behaviorally for the patient  2.  Continue other therapies    Long conversation held with the group home staff regarding the patient at this point in time.  The patient is behaviorally much better since discontinuing day work programming.  Staff are struggling as the patient is resistive to going on outings.  I suspect this relates to the patient's need for more consistent and low level environmental support.  With this in mind I have recommended the above intervention.    Total time for evaluation 25 minutes with majority time spent in counseling.  All questions answered.  Follow-up in 6 months or sooner.      Subjective:     HPI: Malinda Walker is a 53 y.o. female with above-noted diagnoses who is seen in follow-up.  The patient has been much better behaviorally since discontinuing day programming as recommended.  Staff are noting that the patient tends to hide items within the group home.  In addition, she has been somewhat resistive to outings that in the past she seemed to enjoy.  Staff are noting progressive changes in cognitive performance consistent with her Alzheimer's diagnosis.    The patient presents as a well maintained middle-aged female with obvious stigmata of Down syndrome.  Very limited in her social interaction, she does appear to be in good spirits.  She offers no complaints at this time.          Review of Systems:          As above        Objective:   There were no vitals filed for this visit.    No results found for this or  any previous visit (from the past 24 hour(s)).    Physical Exam: As above

## 2021-06-23 NOTE — PROGRESS NOTES
Persons accompanying you (the patient) today: Staff    How have you been doing since we saw you last? Please note any concerns.  She has been fake crying a lot, hard to get staff to redirect.     Please list any recent hospitalizations/surgeries/procedures you've had since we saw you last:  None     Have you had any falls since your last visit? No    Do you have any pain today? No

## 2021-07-03 NOTE — ADDENDUM NOTE
Addendum Note by Radha Phillips CMA at 1/11/2019 11:10 AM     Author: Radha Phillips CMA Service: -- Author Type: Certified Medical Assistant    Filed: 1/11/2019 11:10 AM Date of Service: 1/11/2019 11:10 AM Status: Signed    : Radha Phillips CMA (Certified Medical Assistant)    Encounter addended by: Radha Phillips CMA on: 1/11/2019 11:10 AM      Actions taken: Charge Capture section accepted

## 2021-07-03 NOTE — ADDENDUM NOTE
Addendum Note by Radha Phillips CMA at 10/12/2018 11:59 PM     Author: Radha Phillips CMA Service: -- Author Type: Certified Medical Assistant    Filed: 10/15/2018 10:48 AM Date of Service: 10/12/2018 11:59 PM Status: Signed    : Radha Phillips CMA (Certified Medical Assistant)    Encounter addended by: Radha Phillips CMA on: 10/15/2018 10:48 AM<BR>     Actions taken: Charge Capture section accepted

## 2021-09-16 PROBLEM — A41.9 SEPSIS (H): Status: ACTIVE | Noted: 2021-01-01

## 2021-09-16 NOTE — PHARMACY-VANCOMYCIN DOSING SERVICE
Pharmacy Vancomycin Initial Note  Date of Service 2021  Patient's  1965  56 year old, female    Indication: Sepsis    Current estimated CrCl = Estimated Creatinine Clearance: 42.3 mL/min (based on SCr of 0.83 mg/dL).    Creatinine for last 3 days  2021:  1:22 PM Creatinine 0.83 mg/dL    Recent Vancomycin Level(s) for last 3 days  No results found for requested labs within last 72 hours.      Vancomycin IV Administrations (past 72 hours)      No vancomycin orders with administrations in past 72 hours.                Nephrotoxins and other renal medications (From now, onward)    Start     Dose/Rate Route Frequency Ordered Stop    21 1530  piperacillin-tazobactam (ZOSYN) 3.375 g vial to attach to  mL bag      3.375 g  over 30 Minutes Intravenous ONCE 21 1511      21 1530  vancomycin (VANCOCIN) 750 mg in sodium chloride 0.9 % 250 mL intermittent infusion      750 mg  over 60 Minutes Intravenous ONCE 21 1515            Contrast Orders - past 72 hours (72h ago, onward)    Start     Dose/Rate Route Frequency Ordered Stop    21 1500  iopamidol (ISOVUE-370) solution 79 mL      79 mL Intravenous ONCE 21 1449 21 1450             Plan:  1. Start vancomycin  750 mg IV once 20.9 mg/kg.   2. Pharmacy will continue to follow if re-consult once inpt.    Rocío Aquino [facility]

## 2021-09-16 NOTE — ED NOTES
Bed: JNED-04  Expected date: 9/16/21  Expected time: 12:16 PM  Means of arrival: Ambulance  Comments:  Cough - vomiting. WBL

## 2021-09-16 NOTE — TELEPHONE ENCOUNTER
3-Hospitalist Huddle Documentation    Acuity/Preferred Bed Type: medical floor unless lactic acid elevates or pressures stay low  Infection Concerns: none  Additional Specialist Needed Or Specialist Already Contacted:   None  Timely Treatments Needed: No  Important things to know/address during hospitalization: sitter not needed      Clinic: [facility]  Provider: Dr. Marin  Dx: Severe sepsis and pneumonia    Hx of Downs syndrome (non verbal, DNR/DNI). Fully awake. Not eating as well, today coughing, vomiting and near syncopal event. Hs of aspiration. Has a legal guardian medical power .     Temp 102, 120/62 down to 92/61. On 4 litres, does not look like having hard time breathing.     COVID negative    WBC normal, lactic acid 4.1  Creatining 0.83. trop negative and LFTs normal.    CT abdomen showed lower lobe pneumonia and no other findings.     Given 2 litres of fluid, vanco and zosyn.

## 2021-09-16 NOTE — ED NOTES
Pt's blood pressures have been labile between 70s-120s systolic. ER provider aware. PICC ordered. Repeat lactic acid improving, plan probable transfer to [facility].   Pt appears to be resting comfortably in bed. ER provider reviewed plan of care with pt's nurse at group home and also left message with pt's legal guardian.

## 2021-09-16 NOTE — ED TRIAGE NOTES
Patient arrives via EMS. EMS states that patient was coughing at home and then became short of breath. Patient continued to cough harder and began to vomit, that is when staff called EMS. Staff at home states that patient has not been eating very well for the past couple of days.

## 2021-09-16 NOTE — ED PROVIDER NOTES
Emergency Department Encounter     Evaluation Date & Time:   9/16/2021 12:26 PM    CHIEF COMPLAINT:  Shortness of Breath and Nausea, Vomiting, & Diarrhea      Triage Note:No notes on file      ED COURSE & MEDICAL DECISION MAKING:     ED Course as of Sep 16 1912   Thu Sep 16, 2021   1338 Lactate 4.1.  30ml/kg IVF bolus ordered.      1402 VBG pH 7.35.  WBC 9.4.  Awaiting CXR.      1424 Rest of labs unremarkable. Awaiting imaging.        1522 Pt has DNR/DNI paperwork.      1527 CT abd/pelvis showing lower lobe PNA.  Covid is negative.  Pt started on antibx already.  Will need hospitalization.      No beds available, so we will see if transfer is an option.      1553 MAP > 65.      1731 BP remains intermittently low. Will place PICC for central access with possible vasopressor needs.  I have not heard back from pt's legal guardian, but pt needs central access given repeatedly low Bps with severe sepsis.      1749 Last /56.  Lactate improved, down to 2.3.  I spoke again with [facility] hospitalist PA, Jeannette Nolasco.  Accepts for transfer.      1752 Left a message for pt's legal guardian, Ashley Levy, to call me back.      1822 SBP again 120s.  PICC line to be placed so we have appropriate access if needed for vasopressors. Pt transferring to [facility], accepted by hospitalist LYNETTE Nolasco.      1910 Pressure have been intermittently lower, but MAP > 60, lactate trending down and pt has PICC line now.  No indication for vasopressors at this point.        12:34 PM I met with the patient for the initial interview and physical examination. Discussed plan for treatment and workup in the ED. PPE: Provider wore eye protection, face shield, N95 & surgical mask, gown and gloves. Pt presenting from nursing home with a history of down syndrome and dementia, nonverbal at baseline.  Decreased PO last few days, today coughed, then vomited and fell over.  No injury/fall.  Pt found by EMS to be hypoxic to 80s, improved with 4L  NC to 98%.  Pt found to have a fever of 102 here. She is fully vaccinated for covid. Will get labs including cultures, lactate, covid testing, CXR and CT abd/pelvis.  Anticipate hospitalization.      3:19 PM I rechecked the patient.   4:36 PM I discussed the case with [facility] hospitalist, LYNETTE Moreno.  5:17 PM I spoke with a nurse at the patient's facility, who referred me to patient's legal guardian.       At the conclusion of the encounter I discussed the results of all the tests and the disposition. The questions were answered. The patient or family acknowledged understanding and was agreeable with the care plan.      MEDICATIONS GIVEN IN THE EMERGENCY DEPARTMENT:  Medications   sodium chloride (PF) 0.9% PF flush 3 mL (has no administration in time range)   sodium chloride (PF) 0.9% PF flush 3 mL (3 mLs Intracatheter Not Given 9/16/21 1423)   sodium chloride 0.9% infusion ( Intravenous Rate/Dose Verify 9/16/21 1853)   lidocaine 1 % 0.1-5 mL (has no administration in time range)   lidocaine (LMX4) cream (has no administration in time range)   sodium chloride (PF) 0.9% PF flush 10-40 mL (has no administration in time range)   acetaminophen (TYLENOL) Suppository 650 mg (650 mg Rectal Given 9/16/21 1344)   0.9% sodium chloride BOLUS (0 mL/kg × 35.8 kg Intravenous Stopped 9/16/21 1445)   iopamidol (ISOVUE-370) solution 79 mL (79 mLs Intravenous Given 9/16/21 1450)   piperacillin-tazobactam (ZOSYN) 3.375 g vial to attach to  mL bag (0 g Intravenous Stopped 9/16/21 1635)   vancomycin (VANCOCIN) 750 mg in sodium chloride 0.9 % 250 mL intermittent infusion (0 mg Intravenous Stopped 9/16/21 1752)   0.9% sodium chloride BOLUS (0 mLs Intravenous Stopped 9/16/21 1635)       NEW PRESCRIPTIONS STARTED AT TODAY'S ED VISIT:  New Prescriptions    No medications on file       HPI   HPI     Malinda Walker is a 56 year old female with a pertinent history of Down syndrome, dementia, and seizures who presents to this ED by  ambulance for evaluation of cough and shortness of breath.     Per EMS, patient arrives from a nursing home. Patient has a history of Down syndrome and is nonverbal at baseline. Staff reported that the patient has had decreased appetite for the past few days. Patient was sitting on the couch today when she started coughing, appeared short of breath, and fell over. The patient also had an episode of vomiting during the coughing fit. EMS was called and her O2 sats were found to be in the low 80s. Patient is not normally on oxygen. Otherwise no reported fever, diarrhea, or recent falls or injuries. Patient is fully-vaccinated against COVID-19. No reported sick contacts.       History limited due to patient's nonverbal status.       REVIEW OF SYSTEMS:  Review of Systems   Unable to perform ROS: Patient nonverbal       Medical History     Past Medical History:   Diagnosis Date     Dementia of the Alzheimer's type (H)      Down syndrome      Down's syndrome      Nonverbal      Seizure-like activity (H)      Seizures (H)        Past Surgical History:   Procedure Laterality Date     HYSTERECTOMY       NO HISTORY OF SURGERY         Family History   Family history unknown: Yes       Social History     Tobacco Use     Smoking status: Never Smoker     Smokeless tobacco: Never Used   Substance Use Topics     Alcohol use: No     Drug use: No       benzonatate (TESSALON) 100 MG capsule  calcium carbonate (TUMS) 500 MG chewable tablet  cetirizine (ZYRTEC) 5 MG/5ML solution  diphenhydrAMINE (BENADRYL) 25 MG capsule  divalproex sodium delayed-release (DEPAKOTE SPRINKLE) 125 MG DR capsule  divalproex sodium delayed-release (DEPAKOTE SPRINKLE) 125 MG DR capsule  escitalopram (LEXAPRO) 5 MG tablet  levETIRAcetam (KEPPRA) 250 MG tablet  LORazepam (ATIVAN) 0.5 MG tablet  melatonin 3 MG tablet  miconazole (MICATIN) 2 % external powder        Physical Exam     Triage Vitals:  ED Triage Vitals   Enc Vitals Group      BP       Pulse        "Resp       Temp       Temp src       SpO2       Weight       Height       Head Circumference       Peak Flow       Pain Score       Pain Loc       Pain Edu?       Excl. in GC?         Vitals:  BP (!) 88/50   Pulse 79   Temp (!) 101.7  F (38.7  C) (Rectal)   Resp 13   Ht 1.321 m (4' 4\")   Wt 35.8 kg (79 lb)   SpO2 100%   BMI 20.54 kg/m      PHYSICAL EXAM:   Physical Exam  Vitals and nursing note reviewed.   Constitutional:       General: She is not in acute distress.     Appearance: Normal appearance.      Comments: Warm to the touch   HENT:      Head: Normocephalic and atraumatic.      Nose: Nose normal.      Mouth/Throat:      Mouth: Mucous membranes are moist.   Eyes:      Pupils: Pupils are equal, round, and reactive to light.   Cardiovascular:      Rate and Rhythm: Regular rhythm. Tachycardia present.      Pulses: Normal pulses.           Radial pulses are 2+ on the right side and 2+ on the left side.        Dorsalis pedis pulses are 2+ on the right side and 2+ on the left side.   Pulmonary:      Effort: Pulmonary effort is normal. No respiratory distress.      Breath sounds: Rhonchi (bilateral bases) present.   Abdominal:      Palpations: Abdomen is soft.      Tenderness: There is no abdominal tenderness.   Musculoskeletal:      Cervical back: Full passive range of motion without pain and neck supple.      Comments: No calf tenderness or swelling b/l   Skin:     General: Skin is warm.      Findings: No rash.      Comments: No obvious open sores, wounds, or rash   Neurological:      General: No focal deficit present.      Mental Status: She is alert. Mental status is at baseline.      Comments: Nonverbal  no acute lateralizing deficits   Psychiatric:         Mood and Affect: Mood normal.         Behavior: Behavior normal.         Results     LAB:  All pertinent labs reviewed and interpreted  Labs Ordered and Resulted from Time of ED Arrival Up to the Time of Departure from the ED   COMPREHENSIVE METABOLIC " PANEL - Abnormal; Notable for the following components:       Result Value    Albumin 2.9 (*)     All other components within normal limits   LACTIC ACID WHOLE BLOOD - Abnormal; Notable for the following components:    Lactic Acid 4.1 (*)     All other components within normal limits   BLOOD GAS VENOUS - Abnormal; Notable for the following components:    pCO2 Venous 57 (*)     pO2 Venous 22 (*)     Oxyhemoglobin Venous 32.2 (*)     O2 Sat, Venous 32.8 (*)     All other components within normal limits   PROCALCITONIN - Abnormal; Notable for the following components:    Procalcitonin 0.76 (*)     All other components within normal limits   CBC WITH PLATELETS AND DIFFERENTIAL - Abnormal; Notable for the following components:     (*)     MCH 35.3 (*)     Absolute Neutrophils 8.4 (*)     Absolute Lymphocytes 0.5 (*)     All other components within normal limits   ROUTINE UA WITH MICROSCOPIC REFLEX TO CULTURE - Abnormal; Notable for the following components:    Ketones Urine Trace (*)     Specific Gravity Urine >1.050 (*)     Protein Albumin Urine 10  (*)     Mucus Urine Present (*)     All other components within normal limits    Narrative:     Urine Culture not indicated   LACTIC ACID WHOLE BLOOD - Abnormal; Notable for the following components:    Lactic Acid 2.3 (*)     All other components within normal limits   LIPASE - Normal   CRP INFLAMMATION - Normal   COVID-19 VIRUS (CORONAVIRUS) BY PCR - Normal    Narrative:     Testing was performed using the héctor  SARS-CoV-2 & Influenza A/B Assay on the héctor  Olga  System.  This test should be ordered for the detection of SARS-COV-2 in individuals who meet SARS-CoV-2 clinical and/or epidemiological criteria. Test performance is unknown in asymptomatic patients.  This test is for in vitro diagnostic use under the FDA EUA for laboratories certified under CLIA to perform moderate and/or high complexity testing. This test has not been FDA cleared or approved.  A  negative test does not rule out the presence of PCR inhibitors in the specimen or target RNA in concentration below the limit of detection for the assay. The possibility of a false negative should be considered if the patient's recent exposure or clinical presentation suggests COVID-19.  [facility] are certified under the Clinical Laboratory Improvement Amendments of 1988 (CLIA-88) as qualified to perform moderate and/or high complexity laboratory testing.   TROPONIN I - Normal   PULSE OXIMETRY NURSING   CARDIAC CONTINUOUS MONITORING   PERIPHERAL IV CATHETER   STRICT INTAKE AND OUTPUT   PATIENT CARE ORDER   APPLY WARM PACK   DRESSING   NO   MEASURE AND RECORD   MEASURE AND RECORD   DISCHARGE PLANNING   BLOOD CULTURE   CBC WITH PLATELETS & DIFFERENTIAL    Narrative:     The following orders were created for panel order CBC with platelets differential.  Procedure                               Abnormality         Status                     ---------                               -----------         ------                     CBC with platelets and d...[[id number]]  Abnormal            Final result                 Please view results for these tests on the individual orders.       RADIOLOGY:  CT Abdomen Pelvis w Contrast   Final Result   IMPRESSION:    1.  Bronchial thickening and endobronchial secretions as well as peribronchial groundglass density opacities, interlobular septal thickening and small scattered foci of consolidation throughout the visualized lower lungs. Findings compatible with    bronchial inflammation/infection with probable developing pneumonia.    2.  Appendix appears to be within normal limits with caveat the bowel is not well evaluated patient motion artifact.   3.  Large amount of stool in the distal sigmoid colon and rectum.   4.  Cholelithiasis.   5.  Nonobstructing 4 mm stone lower pole right kidney unchanged.      XR Chest Port 1 View   Final Result   IMPRESSION: The  patient is rotated towards the right. Low lung volumes with accentuation of the cardiac silhouette. The lungs are clear and there are no pleural effusions. Stable upper normal heart size.                   ECG:  Sinus tach, rate 113, no acute ischemia    I have independently reviewed and interpreted the EKG(s) documented above     PROCEDURES:  Procedures:  none      FINAL IMPRESSION:    ICD-10-CM    1. Severe sepsis (H)  A41.9     R65.20    2. Pneumonia of both lower lobes due to infectious organism  J18.9    3. Hypoxia  R09.02      CRITICAL CARE  60 minutes of critical care time with time spent managing severe sepsis with pneumonia.  Time spent interpreting labs, documentation, speaking with consultants/admitting.  Time independent of any procedures.      I, Disha Rizzo, am serving as a scribe to document services personally performed by Dr. Oliver Marin, based on my observations and the provider's statements to me. I, Oliver Marin, DO attest that Disha Rizzo is acting in a scribe capacity, has observed my performance of the services and has documented them in accordance with my direction.      Oliver Marin DO  Emergency Medicine  [facility] EMERGENCY DEPARTMENT  9/16/2021  12:34 PM        Oliver Marin MD  09/16/21 1912

## 2021-09-16 NOTE — PHARMACY-ADMISSION MEDICATION HISTORY
Pharmacy Note - Admission Medication History    Pertinent Provider Information: None     ______________________________________________________________________    Prior To Admission (PTA) med list completed and updated in EMR.       PTA Med List   Medication Sig Last Dose     benzonatate (TESSALON) 100 MG capsule Take 100 mg by mouth 3 times daily as needed for cough Unknown     calcium carbonate (TUMS) 500 MG chewable tablet Take 1 chew tab by mouth daily 9/16/2021     cetirizine (ZYRTEC) 5 MG/5ML solution Take 10 mLs by mouth daily 9/16/2021     diphenhydrAMINE (BENADRYL) 25 MG capsule Take 25 mg by mouth nightly as needed for allergies Unknown     divalproex sodium delayed-release (DEPAKOTE SPRINKLE) 125 MG DR capsule Take 500 mg by mouth At Bedtime 9/15/2021     divalproex sodium delayed-release (DEPAKOTE SPRINKLE) 125 MG DR capsule Take 250 mg by mouth every morning  9/16/2021     escitalopram (LEXAPRO) 5 MG tablet Take 5 mg by mouth daily 9/16/2021     levETIRAcetam (KEPPRA) 250 MG tablet Take 2 tablets (500 mg) by mouth 2 times daily 9/16/2021 at x1     LORazepam (ATIVAN) 0.5 MG tablet Take 0.5-1 mg by mouth as needed (Prior to CT or other procedure)  Unknown     melatonin 3 MG tablet Take 3 mg by mouth At Bedtime 9/15/2021     miconazole (MICATIN) 2 % external powder Apply topically once a week Saturdays - to socks 9/11/2021       Information source(s): Facility ([facility]/) medication list/MAR  Method of interview communication: N/A    Summary of Changes to PTA Med List  New: Benzonatate, diphenhydramine  Discontinued: acetaminophen  Changed: Depakote dose, calcium frequency    Patient was asked about OTC/herbal products specifically.  PTA med list reflects this.    In the past week, patient estimated taking medication this percent of the time:  greater than 90%.    Allergies were reviewed, assessed, and updated with the patient.      Patient did not bring any medications to the hospital and can't retrieve  from home. No multi-dose medications are available for use during hospital stay.     The information provided in this note is only as accurate as the sources available at the time of the update(s).    Thank you for the opportunity to participate in the care of this patient.    Saba Galo [facility]  9/16/2021 2:39 PM

## 2021-09-17 NOTE — PROCEDURES
"PICC Line Insertion Procedure Note  Pt. Name: Malinda Walker  MRN:        [id number]  Procedure: Insertion of a  triple Lumen  5 fr  Bard SOLO (valved) Power PICC, Lot number [id number]    Indications: access/sepsis    Contraindications : none    Procedure Details   Patient identified with 2 identifiers and \"Time Out\" conducted.  .     Central line insertion bundle followed: hand hygeine performed prior to procedure, site cleansed with cholraprep, hat, mask, sterile gloves,sterile gown worn, patient draped with maximum barrier head to toe drape, sterile field maintained.    The vein was assessed and found to be compressible and of adequate size. 2 ml 1% Lidocaine administered sq to the insertion site. A 5 Fr PICC was inserted into the basilic vein of the right arm with ultrasound guidance. 1 attempt(s) required to access vein.   Catheter threaded without difficulty. Good blood return noted.    Modified Seldinger Technique used for insertion.    The 8 sharps that are included in the PICC insertion kit were accounted for and disposed of in the sharps container prior to breakdown of the sterile field.    Catheter secured with Statlock, biopatch and Tegaderm dressing applied.    Findings:  Total catheter length  28 cm, with 1 cm exposed. Mid upper arm circumference is 20 cm. Catheter was flushed with 30 cc NS. Patient  tolerated procedure well.    Tip placement verified by 3CG technology . Tip placement in the SVC.    CLABSI prevention brochure left at bedside.    Patient's primary RN notified PICC is ready for use.    Comments:          Viki Mario, RN [facility]      "

## 2021-09-17 NOTE — PROGRESS NOTES
Late entry note **   Helped assist with transfer of patient to ICU around 0015 on 9/16. Stayed with patient from [id number] and monitored status/obtained vitals until safely transferred to ICU to start on levo drip.

## 2021-09-17 NOTE — PHARMACY-VANCOMYCIN DOSING SERVICE
"Pharmacy Vancomycin Initial Note  Date of Service 2021  Patient's  1965  56 year old, female    Indication: Sepsis    Current estimated CrCl = Estimated Creatinine Clearance: 41.7 mL/min (based on SCr of 0.83 mg/dL).    Creatinine for last 3 days  2021:  1:22 PM Creatinine 0.83 mg/dL    Recent Vancomycin Level(s) for last 3 days  No results found for requested labs within last 72 hours.      Vancomycin IV Administrations (past 72 hours)                   vancomycin (VANCOCIN) 750 mg in sodium chloride 0.9 % 250 mL intermittent infusion (mg) 750 mg New Bag 21 1635                Nephrotoxins and other renal medications (From now, onward)    Start     Dose/Rate Route Frequency Ordered Stop    21 0400  vancomycin (VANCOCIN) 500 mg vial to attach to  mL bag      500 mg  over 1 Hours Intravenous EVERY 12 HOURS 21 2235      21 2230  norepinephrine (LEVOPHED) 4 mg in  mL infusion PREMIX      0.01-0.6 mcg/kg/min × 35.3 kg  1.3-79.4 mL/hr  Intravenous CONTINUOUS 21 2223      21 2230  piperacillin-tazobactam (ZOSYN) intermittent infusion 4.5 g     Note to Pharmacy: For [facility], [facility] and [facility]: For Zosyn-naive patients, use the \"Zosyn initial dose + extended infusion\" order panel.    4.5 g  200 mL/hr over 30 Minutes Intravenous EVERY 6 HOURS 21 2224            Contrast Orders - past 72 hours (72h ago, onward)    None          Loading dose: 750 mg x1 at 1635 on   Regimen: 500 mg IV every 12 hours.  Start time: 04:00 on 2021  Exposure target: AUC24 (range)400-600 mg/L.hr   AUC24,ss: 495 mg/L.hr  Probability of AUC24 > 400: 73 %  Ctrough,ss: 16.1 mg/L  Probability of Ctrough,ss > 20: 29 %  Probability of nephrotoxicity (Lodise TYESHA ): 11 %      Plan:  1. Start vancomycin  500 mg IV q12h.   2. Vancomycin monitoring method: AUC  3. Vancomycin therapeutic monitoring goal: 400-600 mg*h/L  4. Pharmacy will check vancomycin levels as appropriate in " 1-3 Days.    5. Serum creatinine levels will be ordered daily for the first week of therapy and at least twice weekly for subsequent weeks.      Tanner Rodriguez, RPH

## 2021-09-17 NOTE — PLAN OF CARE
ICU End of Shift Summary.  For vital signs and complete assessments, please see documentation flowsheets.     Pertinent assessments: LS coarse/diminished. Weaned to room air. Does not follow commands. Opens eyes spontaneously.   Major Shift Events: Weaned to room air.   Plan (Upcoming Events): Continue cares  Discharge/Transfer Needs: TBD     Bedside Shift Report Completed  Bedside Safety Check Completed

## 2021-09-17 NOTE — PHARMACY-ADMISSION MEDICATION HISTORY
Admission medication history interview status for this patient is complete. See [facility] admission navigator for allergy information, prior to admission medications and immunization status.     Medication history resources (including written lists, pill bottles, clinic record): MAR from [facility]  Pharmacy: [facility] - [place], [place] - [address] [address]    Changes made to PTA medication list: none    Actions taken by pharmacist (provider contacted, etc):None   Additional medication history information:None  Medication reconciliation/reorder completed by provider prior to medication history? no     Prior to Admission medications    Medication Sig Last Dose Taking? Auth Provider   benzonatate (TESSALON) 100 MG capsule Take 100 mg by mouth 3 times daily as needed for cough Past Month at Unknown time Yes Unknown, Entered By History   calcium carbonate (TUMS) 500 MG chewable tablet Take 1 chew tab by mouth daily 9/16/2021 at am Yes Unknown, Entered By History   cetirizine (ZYRTEC) 5 MG/5ML solution Take 10 mLs by mouth daily 9/16/2021 at am Yes Reported, Patient   diphenhydrAMINE (BENADRYL) 25 MG capsule Take 25 mg by mouth nightly as needed for allergies Past Month at Unknown time Yes Unknown, Entered By History   divalproex sodium delayed-release (DEPAKOTE SPRINKLE) 125 MG DR capsule Take 250 mg by mouth every morning  9/16/2021 at am Yes Reported, Patient   escitalopram (LEXAPRO) 5 MG tablet Take 5 mg by mouth daily 9/16/2021 at am Yes Reported, Patient   levETIRAcetam (KEPPRA) 250 MG tablet Take 2 tablets (500 mg) by mouth 2 times daily 9/16/2021 at x 1 Yes Felton Elkins MD   LORazepam (ATIVAN) 0.5 MG tablet Take 0.5-1 mg by mouth as needed (Prior to CT or other procedure)  More than a month at Unknown time Yes Reported, Patient   miconazole (MICATIN) 2 % external powder Apply topically once a week Saturdays - to socks Past Week at Unknown time Yes Reported, Patient   divalproex sodium  delayed-release (DEPAKOTE SPRINKLE) 125 MG DR capsule Take 500 mg by mouth At Bedtime 9/15/2021 at pm  Unknown, Entered By History   melatonin 3 MG tablet Take 3 mg by mouth At Bedtime 9/15/2021 at hs  Reported, Patient

## 2021-09-17 NOTE — PLAN OF CARE
ICU End of Shift Summary.  For vital signs and complete assessments, please see documentation flowsheets.      Pertinent assessments: patient arrived to unit around 0030 this morning. Arrived to unit on 2L. Weaned down to RA. Lung sounds wheezy and coarse. HR sinus aron. MAP fell <65 when patient fell asleep. Levo started. Incontinent bowel and bladder. Zosyn and vanco for sepsis. Ashley called around 0630. Updated on events of last night and plan for abx and levo.     Major Shift Events:   0500 called [employer] asking if labs sound be added on. No extra labs needed at this time.    Plan (Upcoming Events): continue abx, continue levo for MAP <65     Discharge/Transfer Needs: TBD     Bedside Shift Report Completed : yes  Bedside Safety Check Completed: yes

## 2021-09-17 NOTE — PROGRESS NOTES
[facility]  Hospitalist Progress Note  Jevon Jolly MD 09/17/2021    Reason for Stay/active problem list      Septic shock    Pneumonia    Acute encephalopathy         Assessment and Plan:        Summary of Stay:     Malinda Walker is a 56 year old female admitted on 9/16/2021. She reportedly has a past medical history significant for seizure disorder, Down syndrome, and Alzheimer's dementia.  She had presented to outside facility with shortness of breath, nausea, and vomiting.  Found to have pneumonia and severe sepsis with hypotensive shock.      Problem List with Assessment and Plan      1. Septic shock likely secondary to pneumonia    Currently on norepinephrine.  Blood pressure still running low.  Latest blood pressure is 89/46.    Continue Levophed, continue antibiotic, IV fluid and monitor patient.    Lactate was 4.1 on arrival, decreased to 2.3 after fluid bolus.  Procol 0.76      2. Pneumonia with bronchial thickening and endobronchial secretions and peribronchial groundglass opacities, interlobular septal thickening and small scattered consolidations throughout lower lungs      Suspect aspiration pneumonia given patient's history of dysphagia and.  Aspiration pneumonia    Currently on Zosyn and vancomycin    Afebrile, cultures pending    Continue antibiotic, follow cultures continue pressor    3. Acute encephalopathy: Likely septic encephalopathy      Baseline unclear.  Patient history of Down syndrome and dementia.  Appears to be comfortable.    Continue to monitor, treat infection    4.  Acute hypoxic respiratory failure:-Secondary to pneumonia    Currently on 1 L of oxygen, continue supplemental oxygen, wean as able    5.  Down syndrome with dementia and seizure disorder living at group home    Patient is nonverbal, will get speech consultation for assessment of swallowing    Continue antiepileptic medication Keppra IV        #.Incidental findings  this admission that need  follow up with PCP :    Cholelithiasis    Kidney stone            Plan for today:    Continue Levophed, target map of 60, wean as able    Continue antibiotic, follow cultures    Speech pathology consultation    Will obtain more information about patient's group home and baseline blood pressure        LDA     Access : Peripheral     Alba Catheter: Not present        FEN :    Orders Placed This Encounter      NPO for Medical/Clinical Reasons Except for: No Exceptions           Intake/Output Summary (Last 24 hours) at 9/17/2021 1354  Last data filed at 9/17/2021 1200  Gross per 24 hour   Intake 1029 ml   Output --   Net 1029 ml          DVT Prophylaxis:     Enoxaparin (Lovenox) SQ    Code Status:      DNR / DNI    Estimated discharge  disposition and plan: Continue ICU               Interval History (Subjective):        Patient is seen and examined by me today and medical record reviewed.Overnight events noted and care discussed with nursing staff.  Unable to obtain.  Patient nonverbal.  Discussed with care team.  Medical record reviewed                      Physical Exam:        Last Vital Signs:  BP 94/58   Pulse (!) 46   Temp 97.6  F (36.4  C) (Axillary)   Resp 16   Wt 33.6 kg (74 lb 1.2 oz)   SpO2 97%   BMI 19.26 kg/m      I/O last 3 completed shifts:  In: 829 [I.V.:829]  Out: -   Wt Readings from Last 1 Encounters:   09/17/21 33.6 kg (74 lb 1.2 oz)       Wt Readings from Last 5 Encounters:   09/17/21 33.6 kg (74 lb 1.2 oz)   09/16/21 35.8 kg (79 lb)   03/31/21 34.5 kg (76 lb)   02/04/21 35.8 kg (79 lb)   02/19/18 34.9 kg (77 lb)        Constitutional:  Alert but nonverbal and appears confused     Respiratory: Clear to auscultation bilaterally, no crackles or wheezing   Cardiovascular: Regular rate and rhythm, normal S1 and S2, and no murmur noted   Abdomen: Normal bowel sounds, soft, non-distended, non-tender   Skin: No new rashes, no cyanosis, dry to touch   Neuro: Alert with  no new focal weakness    Extremities: No edema, normal range of motion   Other(s):        All other systems:  Unable to obtain          Medications:        All current medications were reviewed with changes reflected in problem list.         Data:      All new lab and imaging data was reviewed.      Data reviewed today: I reviewed all new labs and imaging results over the last 24 hours. I personally reviewed no images or EKG's today      Recent Labs   Lab 09/16/21  1322   WBC 9.4   HGB 13.4   HCT 40.4   *        No results for input(s): CULT in the last 168 hours.  Recent Labs   Lab 09/17/21  1209 09/17/21  0859 09/17/21  0750 09/17/21  0519 09/17/21  0453 09/17/21  0221 09/16/21  1322   NA  --   --   --   --   --   --  141   POTASSIUM  --   --   --   --   --   --  4.1   CHLORIDE  --   --   --   --   --   --  104   CO2  --   --   --   --   --   --  26   ANIONGAP  --   --   --   --   --   --  11   GLC 76 112* 63*  --    < >   < > 97   BUN  --   --   --   --   --   --  22   CR  --   --   --  0.71  --   --  0.83   GFRESTIMATED  --   --   --  >90  --   --  79   VICENTE  --   --   --   --   --   --  9.1   PROTTOTAL  --   --   --   --   --   --  7.4   ALBUMIN  --   --   --   --   --   --  2.9*   BILITOTAL  --   --   --   --   --   --  0.5   ALKPHOS  --   --   --   --   --   --  87   AST  --   --   --   --   --   --  40   ALT  --   --   --   --   --   --  32    < > = values in this interval not displayed.       Recent Labs   Lab 09/17/21  1209 09/17/21  0859 09/17/21  0750 09/17/21  0453 09/17/21  0221   GLC 76 112* 63* 83 82       No results for input(s): INR in the last 168 hours.      No results for input(s): TROPONIN, TROPI, TROPR in the last 168 hours.    Invalid input(s): TROP, TROPONINIES    Recent Results (from the past 48 hour(s))   XR Chest Port 1 View    Narrative    EXAM: XR CHEST PORT 1 VIEW  LOCATION: [facility]  DATE/TIME: 9/16/2021 2:27 PM    INDICATION: cough, fever, hypoxia  COMPARISON: Chest  x-ray 07/01/2021      Impression    IMPRESSION: The patient is rotated towards the right. Low lung volumes with accentuation of the cardiac silhouette. The lungs are clear and there are no pleural effusions. Stable upper normal heart size.   CT Abdomen Pelvis w Contrast    Narrative    EXAM: CT ABDOMEN PELVIS W CONTRAST  LOCATION: [facility]  DATE/TIME: 9/16/2021 2:48 PM    INDICATION: Fever and vomiting in a nonverbal patient with Down syndrome and dementia.  COMPARISON: CT CAP 11/18/2020.  TECHNIQUE: CT scan of the abdomen and pelvis was performed following injection of IV contrast. Multiplanar reformats were obtained. Dose reduction techniques were used.  CONTRAST: Isovue-370, 79 mL IV.    FINDINGS:   LOWER CHEST: Bronchial thickening and endobronchial secretions as well as peribronchial groundglass density opacities, interlobular septal thickening and small scattered foci of consolidation throughout the visualized lower lungs. No pleural effusion.   Heart size normal with trace pericardial effusion.     HEPATOBILIARY: Liver is normal. No bile duct dilatation. Cholelithiasis. Gallbladder otherwise unremarkable.    PANCREAS: Normal.    SPLEEN: Spleen size normal.    ADRENAL GLANDS: Normal.    KIDNEY/BLADDER: Nonobstructing 4 mm stone lower pole right kidney unchanged. Benign cyst upper pole left kidney unchanged and requires no followup. Kidneys, ureters and bladder are otherwise normal.    BOWEL: Appendix appears to be within normal limits with caveat the bowel is not well evaluated due to patient motion artifact. Large amount of stool in the distal sigmoid colon and rectum. No free air or free fluid.    LYMPH NODES: No lymphadenopathy.    VASCULATURE: Normal caliber abdominal aorta.      PELVIC ORGANS: No pelvic mass or fluid.    MUSCULOSKELETAL: Thoracolumbar scoliosis. Chronic bilateral pars defects at L5 with 8 mm anterolisthesis of L5 on S1. No fracture or bone lesion identified.       Impression    IMPRESSION:   1.  Bronchial thickening and endobronchial secretions as well as peribronchial groundglass density opacities, interlobular septal thickening and small scattered foci of consolidation throughout the visualized lower lungs. Findings compatible with   bronchial inflammation/infection with probable developing pneumonia.   2.  Appendix appears to be within normal limits with caveat the bowel is not well evaluated patient motion artifact.  3.  Large amount of stool in the distal sigmoid colon and rectum.  4.  Cholelithiasis.  5.  Nonobstructing 4 mm stone lower pole right kidney unchanged.       COVID Status:  COVID-19 PCR Results    COVID-19 PCR Results 2/7/21 2/11/21 6/29/21 9/16/21   COVID-19 Virus by PCR (External Result) Negative Negative Negative    SARS CoV2 PCR    Negative      Comments are available for some flowsheets but are not being displayed.         COVID-19 Antibody Results, Testing for Immunity    COVID-19 Antibody Results, Testing for Immunity   No data to display.              Disclaimer: This note consists of symbols derived from keyboarding, dictation and/or voice recognition software. As a result, there may be errors in the script that have gone undetected. Please consider this when interpreting information found in this chart.

## 2021-09-17 NOTE — PROGRESS NOTES
I was asked to review paperwork from group home re: code status      Form clearly says DNR/I    Code status changed

## 2021-09-17 NOTE — H&P
[facility]    History and Physical - Hospitalist Service       Date of Admission:  9/16/2021    Assessment & Plan      Mlainda Walker is a 56 year old female admitted on 9/16/2021. She reportedly has a past medical history significant for seizure disorder, Down syndrome, and Alzheimer's dementia.  She had presented to outside facility with shortness of breath, nausea, and vomiting.  Found to have pneumonia and severe sepsis with hypotensive shock.    1.  Severe sepsis with hypotensive shock.  Noted to have endorgan damage with elevated lactic acid at 4.1.  Did have aggressive IV fluids at outside facility.  Also started on antibiotics with vancomycin and Zosyn.  Had a PICC line placed in anticipation of requiring vasopressors.  However, vasopressors were not started at outside facility.  -Start IV norepinephrine continuous drip.  -Continue IV antibiotics with vancomycin and Zosyn.  -Admit to ICU.    2.  Pneumonia.  As noted on CT scan of abdomen/pelvis at outside facility.  With sepsis as noted above.  -IV antibiotics with vancomycin and Zosyn.    3.  Acute encephalopathy.  There is a note that patient is nonverbal at baseline in her chart.  She currently will only awaken with sternal rub.  Only briefly opens her eyes to sternal rub.  Suspect acute encephalopathy due to severe sepsis with hypotensive shock.  -Start IV norepinephrine.  -Antibiotics as noted above.  -Check stat ABG.    4.  Seizure disorder.  -Change Keppra to IV at this time.    60 minutes of critical care time spent with patient and working on direct patient care so far on 9/16/2021.     Diet: NPO for Medical/Clinical Reasons Except for: No Exceptions    DVT Prophylaxis: Enoxaparin (Lovenox) SQ  Alba Catheter: Not present  Central Lines: None  Code Status: Full Code      Clinically Significant Risk Factors Present on Admission                       Handy Schaefer DO  [facility]  Securely message  with the [employer] Web Console (learn more here)  Text page via [facility] Paging/Directory      ______________________________________________________________________    Chief Complaint   Cough.    History is obtained from the patient and electronic health record    History of Present Illness   Malinda Walker is a 56 year old female who has a past medical history significant for seizure disorder, Down syndrome, and Alzheimer's dementia.  She had presented to an outside emergency room with cough, nausea, and vomiting.  Found to have pneumonia and sepsis.  She was transferred to our facility for further care.  She currently is minimally arousable.  Will open her eyes very briefly to sternal rub.  Does not answer any questions.  She does have a legal guardian listed in the computer.  I did attempt to call legal guardian, Ashley, at listed number.  No answer.  I did leave a message for Ashley to call the hospital when she is able.    Review of Systems    Review of systems not obtained due to patient factors - mental status    Past Medical History    I have reviewed this patient's medical history and updated it with pertinent information if needed.   Past Medical History:   Diagnosis Date     Dementia of the Alzheimer's type (H)      Down syndrome      Down's syndrome      Nonverbal      Seizure-like activity (H)     Unable to do EEG- Keppra effective empirically     Seizures (H)        Past Surgical History   I have reviewed this patient's surgical history and updated it with pertinent information if needed.  Past Surgical History:   Procedure Laterality Date     HYSTERECTOMY       NO HISTORY OF SURGERY         Social History   I have reviewed this patient's social history and updated it with pertinent information if needed.  Social History     Tobacco Use     Smoking status: Never Smoker     Smokeless tobacco: Never Used   Substance Use Topics     Alcohol use: No     Drug use: No       Family History     Unobtainable as the  patient currently does not answer any questions.    Prior to Admission Medications   Prior to Admission Medications   Prescriptions Last Dose Informant Patient Reported? Taking?   LORazepam (ATIVAN) 0.5 MG tablet   Yes No   Sig: Take 0.5-1 mg by mouth as needed (Prior to CT or other procedure)    benzonatate (TESSALON) 100 MG capsule   Yes No   Sig: Take 100 mg by mouth 3 times daily as needed for cough   calcium carbonate (TUMS) 500 MG chewable tablet   Yes No   Sig: Take 1 chew tab by mouth daily   cetirizine (ZYRTEC) 5 MG/5ML solution   Yes No   Sig: Take 10 mLs by mouth daily   diphenhydrAMINE (BENADRYL) 25 MG capsule   Yes No   Sig: Take 25 mg by mouth nightly as needed for allergies   divalproex sodium delayed-release (DEPAKOTE SPRINKLE) 125 MG DR capsule   Yes No   Sig: Take 250 mg by mouth every morning    divalproex sodium delayed-release (DEPAKOTE SPRINKLE) 125 MG DR capsule   Yes No   Sig: Take 500 mg by mouth At Bedtime   escitalopram (LEXAPRO) 5 MG tablet   Yes No   Sig: Take 5 mg by mouth daily   levETIRAcetam (KEPPRA) 250 MG tablet   No No   Sig: Take 2 tablets (500 mg) by mouth 2 times daily   melatonin 3 MG tablet   Yes No   Sig: Take 3 mg by mouth At Bedtime   miconazole (MICATIN) 2 % external powder   Yes No   Sig: Apply topically once a week Saturdays - to socks      Facility-Administered Medications: None     Allergies   No Known Allergies    Physical Exam   Vital Signs: Temp: 99.3  F (37.4  C) Temp src: Axillary BP: (!) 81/46 Pulse: 62   Resp: 16 SpO2: 94 % O2 Device: None (Room air) Oxygen Delivery: 1 LPM  Weight: 77 lbs 12.8 oz    Gen: Very lethargic.  Will open eyes to sternal rub.  OP: Appears to have dry mucous membranes.  No obvious lesions.  CV:  Regular, no murmurs.  Lung: Very coarse breath sounds b/l.  Ab:  +BS, soft.  Skin:  Warm, dry to touch.  No rash.  Ext:  No pitting edema LE b/l.      Data   Data reviewed today: I reviewed all medications, new labs and imaging results over the  last 24 hours.     Recent Labs   Lab 09/16/21  1322   WBC 9.4   HGB 13.4   *         POTASSIUM 4.1   CHLORIDE 104   CO2 26   BUN 22   CR 0.83   ANIONGAP 11   VICENTE 9.1   GLC 97   ALBUMIN 2.9*   PROTTOTAL 7.4   BILITOTAL 0.5   ALKPHOS 87   ALT 32   AST 40   LIPASE <9

## 2021-09-17 NOTE — PROGRESS NOTES
MICU Progress Note    Malinda Walker MRN# [id number]   Age: 56 year old YOB: 1965     Date of Admission: 9/16/2021  Date of Service: 09/17/2021   ==================================================  24 HOUR EVENTS:   Admitted for sepsis 2/2 PNA      Changes for Today:  Admitted to ICU, started on NE      ==================================================    ASSESSMENT AND PLAN:  Malinda Walker is a 56 year old female admitted on 9/16/2021. She reportedly has a past medical history significant for seizure disorder, Down syndrome, and Alzheimer's dementia.  She had presented to outside facility with shortness of breath, nausea, and vomiting.  Found to have pneumonia and severe sepsis with shock.      Neuro/psych:   ## Acute encephalopathy  ## History of cognitive disorder/Alzheimer's  Patient has history of Down syndrome and is known to be nonverbal at times.  On presentation to the ED she would only awaken to sternal rub  -Trend    ## Possible depression?  Does not have depression listed in history, however is prescribed escitalopram.  Currently holding PTA escitalopram but low threshold to restart once she stabilizes.    ## Hx Seizure DO  Continue PTA Keppra      Pulmonary:  ## Pneumonia  ## Hypoxia  CT abdomen revealed bronchial thickening and endobronchial secretions as well as peribronchial groundglass opacities, septal thickening, and scattered consolidation consistent with pneumonia.  -See ID for details  -Supplemental oxygen as required to maintain sats above 90      Cardiac:  ## Septic shock  Hypotension and lactic acidosis on admission.  -Norepinephrine to maintain maps greater than 65      Renal:  Renal function appears to be at baseline      Infectious Disease:  ## Pneumonia  ## Septic shock  Fever, lactic acidosis, and shock with pulmonary consolidation on CT abdomen pelvis from outside facility.  Lactic acid normalized on HOD one  Procalcitonin elevated at 0.76 on  admission    ANTIBIOTICS:  Zosyn 9/16-current  Vancomycin 9/16-current    CULTURES:  Sputum culture ordered 9/17 awaiting collection  Blood culture 9/16 no growth to date  UA negative 9/16  COVID-19 negative 9/16      GI/:   -Nutrition: N.p.o. for now, will likely initiate diet as she stabilizes      Endocrine: No acute concerns    Heme: No acute concerns      Prophylaxis:    -GI: PPI   -DVT: Ox apparent      CODE: DNR/DNI listed on paperwork from group home        Attestation:      CCT 35 min excluding procedures        This document was generated with the assistance of voice recognition software. Unintentional transcription errors may occur. Please contact the author for any clarification.    Shon Bernardo M.D.  Pulmonary & Critical Care  Pager: Click Here to page    ==================================================    PHYSICAL EXAM  Temp:  [97.6  F (36.4  C)-102.7  F (39.3  C)] 97.6  F (36.4  C)  Pulse:  [] 54  Resp:  [11-32] 16  BP: ()/() 80/63  SpO2:  [88 %-100 %] 91 %    Resp: 16      General: Alert,  Non-verbal  HEENT: AT/NC, sclera anicteric, PERRL, EOMI, OP clear with MMM  Neck: Supple, no JVD or cervical LAD  Resp: Scattered rhonchi  Cardiac: RRR, NS1,S2, No m/r/g  Abdomen: Soft, nondistended. +BS.    Extremities: No LE edema or obvious joint abnormalities  Skin: Warm and dry, no jaundice or rash  Neuro: Alert, unable to assess orientation due to underlying dementia        =====================================================  LABORATORY DATA    Labs reviewed by me personally, significant abnormalities detailed in assessment and plan.      ROUTINE ICU LABS (Last four results)  CMP  Recent Labs   Lab 09/17/21  0859 09/17/21  0750 09/17/21  0519 09/17/21  0453 09/17/21  0221 09/16/21  1322   NA  --   --   --   --   --  141   POTASSIUM  --   --   --   --   --  4.1   CHLORIDE  --   --   --   --   --  104   CO2  --   --   --   --   --  26   ANIONGAP  --   --   --   --   --  11   GLC  112* 63*  --  83 82 97   BUN  --   --   --   --   --  22   CR  --   --  0.71  --   --  0.83   GFRESTIMATED  --   --  >90  --   --  79   VICENTE  --   --   --   --   --  9.1   PROTTOTAL  --   --   --   --   --  7.4   ALBUMIN  --   --   --   --   --  2.9*   BILITOTAL  --   --   --   --   --  0.5   ALKPHOS  --   --   --   --   --  87   AST  --   --   --   --   --  40   ALT  --   --   --   --   --  32     CBC  Recent Labs   Lab 09/16/21  1322   WBC 9.4   RBC 3.80   HGB 13.4   HCT 40.4   *   MCH 35.3*   MCHC 33.2   RDW 14.8        INRNo lab results found in last 7 days.  Arterial Blood Gas  Recent Labs   Lab 09/16/21  2233   PH 7.39   PCO2 43   PO2 67*   HCO3 26   O2PER 21     Venous Blood Gas   Recent Labs   Lab 09/16/21  2233 09/16/21  1323   PHV  --  7.35   PCO2V  --  57*   PO2V  --  22*   HCO3V  --  27   RACHAEL  --  5.8   O2PER 21  --          No results for input(s): CULT in the last 168 hours.      Recent Results (from the past 48 hour(s))   XR Chest Port 1 View    Narrative    EXAM: XR CHEST PORT 1 VIEW  LOCATION: [facility]  DATE/TIME: 9/16/2021 2:27 PM    INDICATION: cough, fever, hypoxia  COMPARISON: Chest x-ray 07/01/2021      Impression    IMPRESSION: The patient is rotated towards the right. Low lung volumes with accentuation of the cardiac silhouette. The lungs are clear and there are no pleural effusions. Stable upper normal heart size.   CT Abdomen Pelvis w Contrast    Narrative    EXAM: CT ABDOMEN PELVIS W CONTRAST  LOCATION: [facility]  DATE/TIME: 9/16/2021 2:48 PM    INDICATION: Fever and vomiting in a nonverbal patient with Down syndrome and dementia.  COMPARISON: CT CAP 11/18/2020.  TECHNIQUE: CT scan of the abdomen and pelvis was performed following injection of IV contrast. Multiplanar reformats were obtained. Dose reduction techniques were used.  CONTRAST: Isovue-370, 79 mL IV.    FINDINGS:   LOWER CHEST: Bronchial thickening and  endobronchial secretions as well as peribronchial groundglass density opacities, interlobular septal thickening and small scattered foci of consolidation throughout the visualized lower lungs. No pleural effusion.   Heart size normal with trace pericardial effusion.     HEPATOBILIARY: Liver is normal. No bile duct dilatation. Cholelithiasis. Gallbladder otherwise unremarkable.    PANCREAS: Normal.    SPLEEN: Spleen size normal.    ADRENAL GLANDS: Normal.    KIDNEY/BLADDER: Nonobstructing 4 mm stone lower pole right kidney unchanged. Benign cyst upper pole left kidney unchanged and requires no followup. Kidneys, ureters and bladder are otherwise normal.    BOWEL: Appendix appears to be within normal limits with caveat the bowel is not well evaluated due to patient motion artifact. Large amount of stool in the distal sigmoid colon and rectum. No free air or free fluid.    LYMPH NODES: No lymphadenopathy.    VASCULATURE: Normal caliber abdominal aorta.      PELVIC ORGANS: No pelvic mass or fluid.    MUSCULOSKELETAL: Thoracolumbar scoliosis. Chronic bilateral pars defects at L5 with 8 mm anterolisthesis of L5 on S1. No fracture or bone lesion identified.      Impression    IMPRESSION:   1.  Bronchial thickening and endobronchial secretions as well as peribronchial groundglass density opacities, interlobular septal thickening and small scattered foci of consolidation throughout the visualized lower lungs. Findings compatible with   bronchial inflammation/infection with probable developing pneumonia.   2.  Appendix appears to be within normal limits with caveat the bowel is not well evaluated patient motion artifact.  3.  Large amount of stool in the distal sigmoid colon and rectum.  4.  Cholelithiasis.  5.  Nonobstructing 4 mm stone lower pole right kidney unchanged.           ==================================================

## 2021-09-17 NOTE — ED NOTES
Pt has remained calm and appears fairly comfortable in bed. Pt more alert now and appears improved than on arrival. No further vomiting since arrival to ED and breathing appears nonlabored on 2 liters O2.   Pur-wick has been in place with clean brief, urine quite dark vinh.

## 2021-09-17 NOTE — PROGRESS NOTES
"Clinical Swallow Evaluation:      09/17/21 3854   General Information   Onset of Illness/Injury or Date of Surgery 09/16/21   Referring Physician Dr. Jolly   Patient/Family Therapy Goal Statement (SLP) Nonverbal, did not state   Pertinent History of Current Problem Pt with PMHx of seizure disorder, down syndrome, alzheimer's dementia admitted from  with SOB, nausea, vomiting - found to have septic shock likely 2/2 PNA, acute hypoxic respiratory failure, acute encephalopathy. SLP for swallow evaluation.   General Observations Pt alert, orally accepting small amounts of PO for evaluation   Past History of Dysphagia   Recent admission at outside hospital for seizure, fall, aspiration PNA. SLP completed a VFSS on 7/2/21: \"moderate oral pharyngeal dysphagia with deep penetration of thin liquids by teaspoon and cup/sip without clearing, suspect eventual aspiration. No laryngeal penetration or aspiration noted with mildly thick liquids or solids, but Patient at risk with mildly thick liquids due to premature loss especially with larger volumes. Pharyngeal statis was not a significant problem.\" Recommended: 1. IDDSI 4/puree. 2. Mildly thick liquids by teaspoon or small cup/sip. 3. Cut/crush pills in puree. 4. Supervision with all PO.      Type of Evaluation   Type of Evaluation Swallow Evaluation   Oral Motor   Oral Musculature unable to assess due to poor participation/comprehension   Mucosal Quality good   Dentition (Oral Motor)   Dentition (Oral Motor) natural dentition;significant number of missing teeth   General Swallowing Observations   Current Diet/Method of Nutritional Intake (General Swallowing Observations, NIS) NPO   Respiratory Support (General Swallowing Observations) nasal cannula  (1L)   Swallowing Evaluation Clinical swallow evaluation   Clinical Swallow Evaluation   Feeding Assistance dependent   Clinical Swallow Evaluation Textures Trialed mildly thick liquids;pureed   Clinical Swallow Eval: Mildly " Thick Liquids   Mode of Presentation spoon;fed by clinician   Volume Presented x10 tsps   Oral Phase poor AP movement;premature pharyngeal entry   Pharyngeal Phase reduction in laryngeal movement   Diagnostic Statement no overt clinical signs/sx aspiration noted   Clinical Swallow Evaluation: Puree Solid Texture Trial   Mode of Presentation, Puree spoon;fed by clinician   Volume of Puree Presented x5 tsps   Oral Phase, Puree Poor AP movement   Pharyngeal Phase, Puree reduction in laryngeal movement   Diagnostic Statement no overt clinical signs/sx aspiration noted   Swallowing Recommendations   Diet Consistency Recommendations pureed (level 4);mildly thick liquids (level 2)   Supervision Level for Intake 1:1 supervision needed   Mode of Delivery Recommendations bolus size, small;no cups;no straws;slow rate of intake  (all via tsp only, verify each swallow)   Recommended Feeding/Eating Techniques (Swallow Eval) maintain upright sitting position for eating;maintain upright posture during/after eating for 30 minutes;provide assist with feeding   Medication Administration Recommendations, Swallowing (SLP) Cut/crush pills in puree   Instrumental Assessment Recommendations   (not indicated given recent VFSS 7/2021 (2 months ago))   General Therapy Interventions   Planned Therapy Interventions Dysphagia Treatment   SLP Therapy Assessment/Plan   Criteria for Skilled Therapeutic Interventions Met (SLP Eval) yes;treatment indicated   SLP Diagnosis moderate oral and suspected pharyngeal dysphagia    Rehab Potential (SLP Eval) good, to achieve stated therapy goals   Therapy Frequency (SLP Eval) 3 times/wk   Predicted Duration of Therapy Intervention (SLP Eval) 1 week   Comment, Therapy Assessment/Plan (SLP)   Pt presents with moderate oral and suspected pharyngeal dysphagia on clinical swallow evaluation, likely near recent function compared to VFSS in 7/2021. 1:1 assist required, pt did not attempt cup drinking despite hand  over hand assistance or 1:1 assist of writer placing/tilting cup to lips. Assessed with puree, mildly thick liquids all via tsp only. Orally accepting boluses though requires time between each/ will not open mouth until ready. Lingual pumping, slow A/P transit and reduced oral control noted. Variable swallow delay (mild-mod delay at times) and reduced laryngeal elevation to palpation. Pt's vital signs remained stable across intake and no overt clinical signs/sx aspiration noted. Given swallow delays and deep penetration/suspect eventual aspiration on recent VFSS, thin liquids not trialed.      Therapy Plan Review/Discharge Plan (SLP)   Therapy Plan Review (SLP) evaluation/treatment results reviewed;care plan/treatment goals reviewed;participants included;patient   SLP Discharge Planning    SLP Discharge Recommendation (DC Rec)   ( )   SLP Rationale for DC Rec Pt likely near recent baseline, if below recommend  SLP follow up upon d/c   SLP Brief overview of current status  1. IDDSI 4/puree. 2. Mildly thick liquids by teaspoon. 3. Cut/crush pills in puree. 4. 1:1 assist/supervision with all PO. Verify each swallow    Total Evaluation Time   Total Evaluation Time (Minutes) 14

## 2021-09-18 NOTE — PROGRESS NOTES
XC    Initially felt ICU bed needed for floor, this was the only patient that seemed appropriate for transfer    Transfer orders in

## 2021-09-18 NOTE — PHARMACY-VANCOMYCIN DOSING SERVICE
"Pharmacy Vancomycin Note  Date of Service 2021  Patient's  1965   56 year old, female    Indication: Sepsis  Day of Therapy: 3  Current vancomycin regimen:  500 mg IV q12h  Current vancomycin monitoring method: AUC  Current vancomycin therapeutic monitoring goal: 400-600 mg*h/L    Current estimated CrCl = Estimated Creatinine Clearance: 58.6 mL/min (based on SCr of 0.62 mg/dL).    Creatinine for last 3 days  2021:  1:22 PM Creatinine 0.83 mg/dL  2021:  5:19 AM Creatinine 0.71 mg/dL  2021:  5:32 AM Creatinine 0.62 mg/dL    Recent Vancomycin Levels (past 3 days)  2021:  5:32 AM Vancomycin 20.4 mg/L; 12:45 PM Vancomycin 11.7 mg/L    Vancomycin IV Administrations (past 72 hours)                   vancomycin (VANCOCIN) 500 mg vial to attach to  mL bag (mg) 500 mg New Bag 21 0319     500 mg New Bag 21 1648     500 mg New Bag  0454    vancomycin (VANCOCIN) 750 mg in sodium chloride 0.9 % 250 mL intermittent infusion (mg) 750 mg New Bag 21 1635                Nephrotoxins and other renal medications (From now, onward)    Start     Dose/Rate Route Frequency Ordered Stop    21 1400  piperacillin-tazobactam (ZOSYN) infusion 3.375 g     Note to Pharmacy: For [facility], [facility] and [facility]: For Zosyn-naive patients, use the \"Zosyn initial dose + extended infusion\" order panel.    3.375 g  100 mL/hr over 30 Minutes Intravenous EVERY 6 HOURS 21 1303      21 0400  vancomycin (VANCOCIN) 500 mg vial to attach to  mL bag      500 mg  over 1 Hours Intravenous EVERY 12 HOURS 21 2235               Contrast Orders - past 72 hours (72h ago, onward)    None          Interpretation of levels and current regimen:  Vancomycin level is reflective of    Has serum creatinine changed greater than 50% in last 72 hours: No    Urine output:  unable to determine    Renal Function: Stable  Regimen: 650 mg IV every 12 hours.  Exposure target: AUC24 (range)400-600 mg/L.hr "   AUC24,ss: 489 mg/L.hr  Probability of AUC24 > 400: 86 %  Ctrough,ss: 14.8 mg/L  Probability of Ctrough,ss > 20: 13 %  Probability of nephrotoxicity (Lodise TYESHA 2009): 10 %      Plan:  1. Increase Dose to 650 mg  q12   2. Vancomycin monitoring method: AUC  3. Vancomycin therapeutic monitoring goal: 400-600 mg*h/L  4. Pharmacy will check vancomycin levels as appropriate in 1-3 Days.  5. Serum creatinine levels will be ordered daily for the first week of therapy and at least twice weekly for subsequent weeks.    Rosalba Ramachandran, [facility]

## 2021-09-18 NOTE — PLAN OF CARE
Patient drowsey most of day, non verbal, flat affect occasional smile, missing teeth, took in only a couple of spoon fuls in afternoon otherwise refused all food or liq.      Seemed very weak ,moving in bed, inconsistent with following commands, not sure if patient capable to do hand grasp, if she understands    Lungs cont diminished, coarse, no voimiting nor coughing during shift, does withdraw from pain or dicomfort.      Two times today, this am and afternoon, accuchecks below 70, treated with 1/2 amp of D50.  With good results.      Incontinent of urine, 1 bmincontinent this am, abd distentended and slightly firm.  Does not seem in any discomfort.    vss levophed off since noon, oxygen per nc used in am,for lower sats, patient cont to pull off, sats seemed above 92 most of day, afebrile.    Receiving antibiotics, lactic acid is 0.8 this evening    End of Shift Summary.  For vital signs and complete assessments, please see documentation flowsheets.     Pertinent assessments: see above, converastion with carmen lee lpn who has cared for patient for 6 years,  Carmen stated since Malinda had covid in nov 2020, patient has been unable to feed self, walk of sit up, is very different since then, if out of bed needs a lift.   Major Shift Events: no seizure activity,   Plan (Upcoming Events): cont poc, evavluate if home meds can be restarted, if patient can be out of bed, eat per speech recommendations., etc  Discharge/Transfer Needs: tbd    Bedside Shift Report Completed : yes   Bedside Safety Check Completed: yes

## 2021-09-18 NOTE — PROGRESS NOTES
[facility]  Hospitalist Progress Note  Jevon Jolly MD 09/18/2021    Reason for Stay/active problem list      Septic shock    Pneumonia    Acute encephalopathy         Assessment and Plan:        Summary of Stay:     Malinda Walker is a 56 year old female admitted on 9/16/2021. She reportedly has a past medical history significant for seizure disorder, Down syndrome, and Alzheimer's dementia.  She had presented to outside facility with shortness of breath, nausea, and vomiting.  Found to have pneumonia and severe sepsis with hypotensive shock.      Problem List with Assessment and Plan      1. Septic shock likely secondary to pneumonia    Currently off  norepinephrine.  Blood pressure  Stable      continue antibiotic, and follow culture     Lactate was 4.1 on arrival, decreased to 2.3 after fluid bolus.  Procol 0.76      2. Pneumonia with bronchial thickening and endobronchial secretions and peribronchial groundglass opacities, interlobular septal thickening and small scattered consolidations throughout lower lungs      Suspect aspiration pneumonia given patient's history of dysphagia and.  Aspiration pneumonia    Currently on Zosyn and vancomycin    Afebrile, cultures negative to date , will discontinue vanco     Seen by speech and recommendation noted     3. Acute encephalopathy: Likely septic encephalopathy       Patient history of Down syndrome and dementia.  Appears to be comfortable.    Continue to monitor, treat infection    4.  Acute hypoxic respiratory failure:-Secondary to pneumonia    Weaned off oxygen     5.  Down syndrome with dementia and seizure disorder living at group home    Patient is nonverbal    Continue antiepileptic medication Keppra other PTA med as able         #.Incidental findings  this admission that need follow up with PCP :    Cholelithiasis    Kidney stone            Plan for today:    May transfer to floor     Addendum  I spoke with patient's group home  "staff.  They prefer to take her back on Monday    LDA     Access : Peripheral     Alba Catheter: Not present        FEN :    Orders Placed This Encounter      Pureed Diet (level 4) Mildly Thick (level 2) (tsp only, 1:1 assist)           Intake/Output Summary (Last 24 hours) at 9/17/2021 1354  Last data filed at 9/17/2021 1200  Gross per 24 hour   Intake 1029 ml   Output --   Net 1029 ml          DVT Prophylaxis:     Enoxaparin (Lovenox) SQ    Code Status:      DNR / DNI    Estimated discharge  disposition and plan: may transfer to floor               Interval History (Subjective):        Patient is seen and examined by me today and medical record reviewed.Overnight events noted and care discussed with nursing staff.  Unable to obtain.  Patient nonverbal.  Discussed with care team.  Medical record reviewed                  Physical Exam:        Last Vital Signs:  BP (!) 105/91 (BP Location: Left arm)   Pulse 53   Temp 97  F (36.1  C) (Axillary)   Resp 16   Ht 1.321 m (4' 4\")   Wt 37.1 kg (81 lb 12.7 oz)   SpO2 98%   BMI 21.27 kg/m      I/O last 3 completed shifts:  In: 2413 [P.O.:30; I.V.:2383]  Out: -   Wt Readings from Last 1 Encounters:   09/18/21 37.1 kg (81 lb 12.7 oz)       Wt Readings from Last 5 Encounters:   09/18/21 37.1 kg (81 lb 12.7 oz)   09/16/21 35.8 kg (79 lb)   03/31/21 34.5 kg (76 lb)   02/04/21 35.8 kg (79 lb)   02/19/18 34.9 kg (77 lb)        Constitutional:  Alert but nonverbal and appears confused     Respiratory: Clear to auscultation bilaterally, no crackles or wheezing   Cardiovascular: Regular rate and rhythm, normal S1 and S2, and no murmur noted   Abdomen: Normal bowel sounds, soft, non-distended, non-tender   Skin: No new rashes, no cyanosis, dry to touch   Neuro: Alert with  no new focal weakness   Extremities: No edema, normal range of motion   Other(s):        All other systems:  Unable to obtain          Medications:        All current medications were reviewed with changes " reflected in problem list.         Data:      All new lab and imaging data was reviewed.      Data reviewed today: I reviewed all new labs and imaging results over the last 24 hours. I personally reviewed no images or EKG's today      Recent Labs   Lab 09/18/21  0532 09/17/21  1700 09/16/21  1322   WBC 7.7 9.6 9.4   HGB 9.2* 9.6* 13.4   HCT 29.1* 29.3* 40.4   * 107* 106*   * 125* 190     No results for input(s): CULT in the last 168 hours.  Recent Labs   Lab 09/18/21  1202 09/18/21  0801 09/18/21  0636 09/18/21  0532 09/18/21  0406 09/17/21  0750 09/17/21  0519 09/17/21  0221 09/16/21  1322   NA  --   --   --  146*  --   --   --   --  141   POTASSIUM  --   --   --  3.0*  --   --   --   --  4.1   CHLORIDE  --   --   --  115*  --   --   --   --  104   CO2  --   --   --  25  --   --   --   --  26   ANIONGAP  --   --   --  6  --   --   --   --  11   * 106* 105* 98   < >   < >  --    < > 97   BUN  --   --   --  12  --   --   --   --  22   CR  --   --   --  0.62  --   --  0.71  --  0.83   GFRESTIMATED  --   --   --  >90  --   --  >90  --  79   VICENTE  --   --   --  7.1*  --   --   --   --  9.1   PROTTOTAL  --   --   --  4.9*  --   --   --   --  7.4   ALBUMIN  --   --   --  1.7*  --   --   --   --  2.9*   BILITOTAL  --   --   --  1.1  --   --   --   --  0.5   ALKPHOS  --   --   --  49  --   --   --   --  87   AST  --   --   --  24  --   --   --   --  40   ALT  --   --   --  22  --   --   --   --  32    < > = values in this interval not displayed.       Recent Labs   Lab 09/18/21  1202 09/18/21  0801 09/18/21  0636 09/18/21  0532 09/18/21  0406   * 106* 105* 98 84       No results for input(s): INR in the last 168 hours.      No results for input(s): TROPONIN, TROPI, TROPR in the last 168 hours.    Invalid input(s): TROP, TROPONINIES    Recent Results (from the past 48 hour(s))   XR Chest Port 1 View    Narrative    EXAM: XR CHEST PORT 1 VIEW  LOCATION: [facility]  HOSPITAL  DATE/TIME: 9/16/2021 2:27 PM    INDICATION: cough, fever, hypoxia  COMPARISON: Chest x-ray 07/01/2021      Impression    IMPRESSION: The patient is rotated towards the right. Low lung volumes with accentuation of the cardiac silhouette. The lungs are clear and there are no pleural effusions. Stable upper normal heart size.   CT Abdomen Pelvis w Contrast    Narrative    EXAM: CT ABDOMEN PELVIS W CONTRAST  LOCATION: [facility]  DATE/TIME: 9/16/2021 2:48 PM    INDICATION: Fever and vomiting in a nonverbal patient with Down syndrome and dementia.  COMPARISON: CT [facility] 11/18/2020.  TECHNIQUE: CT scan of the abdomen and pelvis was performed following injection of IV contrast. Multiplanar reformats were obtained. Dose reduction techniques were used.  CONTRAST: Isovue-370, 79 mL IV.    FINDINGS:   LOWER CHEST: Bronchial thickening and endobronchial secretions as well as peribronchial groundglass density opacities, interlobular septal thickening and small scattered foci of consolidation throughout the visualized lower lungs. No pleural effusion.   Heart size normal with trace pericardial effusion.     HEPATOBILIARY: Liver is normal. No bile duct dilatation. Cholelithiasis. Gallbladder otherwise unremarkable.    PANCREAS: Normal.    SPLEEN: Spleen size normal.    ADRENAL GLANDS: Normal.    KIDNEY/BLADDER: Nonobstructing 4 mm stone lower pole right kidney unchanged. Benign cyst upper pole left kidney unchanged and requires no followup. Kidneys, ureters and bladder are otherwise normal.    BOWEL: Appendix appears to be within normal limits with caveat the bowel is not well evaluated due to patient motion artifact. Large amount of stool in the distal sigmoid colon and rectum. No free air or free fluid.    LYMPH NODES: No lymphadenopathy.    VASCULATURE: Normal caliber abdominal aorta.      PELVIC ORGANS: No pelvic mass or fluid.    MUSCULOSKELETAL: Thoracolumbar scoliosis. Chronic bilateral pars  defects at L5 with 8 mm anterolisthesis of L5 on S1. No fracture or bone lesion identified.      Impression    IMPRESSION:   1.  Bronchial thickening and endobronchial secretions as well as peribronchial groundglass density opacities, interlobular septal thickening and small scattered foci of consolidation throughout the visualized lower lungs. Findings compatible with   bronchial inflammation/infection with probable developing pneumonia.   2.  Appendix appears to be within normal limits with caveat the bowel is not well evaluated patient motion artifact.  3.  Large amount of stool in the distal sigmoid colon and rectum.  4.  Cholelithiasis.  5.  Nonobstructing 4 mm stone lower pole right kidney unchanged.       COVID Status:  COVID-19 PCR Results    COVID-19 PCR Results 2/7/21 2/11/21 6/29/21 9/16/21   COVID-19 Virus by PCR (External Result) Negative Negative Negative    SARS CoV2 PCR    Negative      Comments are available for some flowsheets but are not being displayed.         COVID-19 Antibody Results, Testing for Immunity    COVID-19 Antibody Results, Testing for Immunity   No data to display.              Disclaimer: This note consists of symbols derived from keyboarding, dictation and/or voice recognition software. As a result, there may be errors in the script that have gone undetected. Please consider this when interpreting information found in this chart.

## 2021-09-18 NOTE — ED NOTES
-overflow patient waiting for a med-surg bed to open up. Calm and    cooperative but non verbal. .VSS with soft but normal BP.  - Continues to be on RA with sats > 90% at all times.  - Tele with 1st degree block, regular.  - No skin issues noted. No swelling to PICC insertion site. Mild receding    redness.  Dressing CDI, Flushes well with immediate blood return.  -incontinent of urine.   -On thickened liquids, able to drink 120cc tonight  - blood sugar drped tonight, one time 2ml of D50 given and then blood sugar   dropped a gain after 2 hours. D5 1/2NS started at 50cc/hr after consultation eith tele   hub. discontinue with improved appetite and stable sugars.

## 2021-09-18 NOTE — PROVIDER NOTIFICATION
Paged Dr. Jolly:  Potassium today is 3.0, do you want pt on K+/Mg protocol?   Thanks,   Annamarie HOWELL

## 2021-09-18 NOTE — PLAN OF CARE
Patient awake, opens eyes spontaneously, lethargic, hr sb hr 40-50, sats mid to hi 90's, arouses with stimulation      Patient more awake and alert during day, cont non verbal. vss, held ivpb kepra,,gave po kepra and other meds.    Vss, cont to be incontinent of urine and changed every 2 hours, no BM

## 2021-09-19 NOTE — PROGRESS NOTES
"[facility]    Hospitalist Progress Note             Date of Admission:  9/16/2021                   Day of hospitalization: 3    Assessment and Plan:   Malinda Walker is a 56 year old female admitted on 9/16/2021. She reportedly has a past medical history significant for seizure disorder, Down syndrome, and Alzheimer's dementia.  She had presented to outside facility with shortness of breath, nausea, and vomiting.  Found to have pneumonia and severe sepsis with hypotensive shock.     Septic shock likely secondary to pneumonia  -Patient was in the ICU initially required Levophed.  Stable he was treated with IV vancomycin and Zosyn currently on Zosyn alone. Will order MRSA swab     Pneumonia presumed gram-negative  -Currently on IV  Zosyn as above suspect is a significant component of aspiration.   -Speech therapy has been consulted    Acute encephalopathy: Likely septic encephalopathy  -Patient has history of Down syndrome and dementia appears to be comfortable stable.    Acute hypoxic respiratory failure secondary to pneumonia  -On room air    Down syndrome with dementia and seizure disorder living at group home  -Nonverbal continue her antiepileptic regimen of Keppra    Hypernatremia  -Improved with IV fluid    Incidental findings  this admission that need follow up with PCP :  Cholelithiasis  Kidney stone      ---------     # Code status: DNR/DNI  # Anticipated discharge date and Disposition: In 1 to 2 days pending clinical improvement and adequate oral intake  # DVT: Lovenox   # IVF: None                       Dottie Lopez MD  Text Page (7am - 6pm, M-F)               Subjective   Chief Complaint: confusion  Patient at bedside difficult to interpret present verbalize appears comfortable    Objective   /62 (BP Location: Left leg)   Pulse 50   Temp 97.5  F (36.4  C) (Axillary)   Resp 12   Ht 1.321 m (4' 4\")   Wt 37.1 kg (81 lb 12.7 oz)   SpO2 99%   BMI 21.27 kg/m       Physical " Exam  General: Pt in NAD, normal appearance  HEENT: OP clear MMM, no JVD  Lungs: Clear to Auscultation Bilateral, normal breathing  without accessory muscle usage, no wheezing, rhonchi or crackles  Cardiac: +S1, S2, RRR, no MRG, no edema  Abdominal: normal bowel sounds, NT/ND  Skin: warm, dry, normal turgor, no rash  Psyche: A& O x0, appropriate affect             Intake/Output Summary (Last 24 hours) at 9/19/2021 1212  Last data filed at 9/18/2021 2046  Gross per 24 hour   Intake 1087.5 ml   Output --   Net 1087.5 ml           Labs and Imaging Results:      Recent Labs   Lab 09/19/21  0633 09/18/21  0532 09/17/21  1700 09/17/21  1700   WBC  --  7.7  --  9.6   HGB  --  9.2*  --  9.6*    130*   < > 125*    < > = values in this interval not displayed.        Recent Labs   Lab 09/19/21  0851 09/18/21  0532    146*   CO2 27 25   BUN 7 12      No results for input(s): INR, PTT in the last 168 hours.   No results for input(s): CKMB in the last 168 hours.    Invalid input(s): TROPONINT     Recent Labs   Lab 09/18/21  0532 09/16/21  1322   ALBUMIN 1.7* 2.9*   AST 24 40   ALT 22 32   ALKPHOS 49 87        Micro:     Radio:  No orders to display           Medications:      Scheduled Meds:      calcium carbonate  500 mg Oral Daily     cetirizine  10 mg Oral Daily     divalproex sodium delayed-release  250 mg Oral QAM     divalproex sodium delayed-release  500 mg Oral At Bedtime     enoxaparin ANTICOAGULANT  30 mg Subcutaneous Q24H     escitalopram  5 mg Oral Daily     levETIRAcetam  500 mg Oral BID     pantoprazole  40 mg Oral QAM AC     piperacillin-tazobactam  3.375 g Intravenous Q6H     Continuous Infusions:      dextrose 5% and 0.45% NaCl 50 mL/hr at 09/19/21 0301     PRN Meds:  benzonatate, glucose **OR** dextrose **OR** glucagon, diphenhydrAMINE, ondansetron **OR** ondansetron

## 2021-09-19 NOTE — PLAN OF CARE
To Do:  End of Shift Summary  For vital signs and complete assessments, please see documentation flowsheets.     Pertinent assessments: Unable to assess patients orientation. No non verbal signs or symptoms of pain noted this shift. Active bowel sounds, one bowel movement this shift. Unable to express nausea or SOB. Frequent repositioning. Poor PO intake.     Major Shift Events: IV fluids discontinued. Monitor blood sugars. Encourage PO intake. MRSA swab negative     Treatment Plan: Potential discharge back to group home tomorrow if patients blood sugars remain stable.     Bedside Nurse: Saba Chowdhury RN

## 2021-09-19 NOTE — PLAN OF CARE
End of Shift Summary  For vital signs and complete assessments, please see documentation flowsheets.     Pertinent assessments: Lung sounds diminished, coarse. No cough noted. Remains on RA. No respiratory distress noted. Bowel sounds active, passing flatus. Incontinent of bowel and bladder; stools soft brown. Restlessness noted to increase with incontinence. Only eating dessert-like items at meals. Encouraging fluids. Turning and repositioning every 2 hours. Alarms on for safety.   Major Shift Events: Transferred to unit from ICU. Potassium replaced via IV.   Treatment Plan: Zosyn. Monitor electrolytes. Provide for safety.

## 2021-09-19 NOTE — PROGRESS NOTES
VSS. Afebrile. On RA. LS diminished. Sleepy but arouses to voice. Nonverbal. Tele: SB. Incontinent of bowel & bladder. Bowel sounds active. Repo q2h. Lift assist. IVF infusing via midline. BGs: 92 & 87. Pureed diet, mildly thick liquids. Pills crushed. 1:1 feed. Seizure/aspiration precautions. Alarms for safety. Potassium 3.6, recheck next AM.    Plan: IV Zosyn, follow cultures/labs. Speech following.

## 2021-09-20 NOTE — DISCHARGE SUMMARY
Discharge Summary  Hospitalist Service      Malinda Walker MRN# [id number]   YOB: 1965 Age: 56 year old     Date of Admission:  9/16/2021  Date of Discharge:  9/20/2021  Admitting Physician:  Handy Schaefer DO  Discharge Physician: Dottie Lopez MD   Discharging Service: Hospitalist Service     Primary Provider: Iwona Delgado  Primary Care Physician Phone Number: [phone]         Discharge Diagnoses/Problem Oriented Hospital Course (Providers):      Discharge Diagnoses   Septic shock likely secondary to pneumonia  Pneumonia presumed gram-negative  Acute encephalopathy: Likely septic encephalopathy  Acute hypoxic respiratory failure secondary to pneumonia  Down syndrome with dementia and seizure disorder living at group home  Hypernatremia  Cholelithiasis  Kidney stone  Hypokalemia    Hospital Course   Malinda Walker is a 56 year old female admitted on 9/16/2021. She reportedly has a past medical history significant for seizure disorder, Down syndrome, and Alzheimer's dementia.  She had presented to outside facility with shortness of breath, nausea, and vomiting.  Found to have pneumonia and severe sepsis with hypotensive shock.  Patient was initially in the ICU on broad-spectrum antibiotics Vanco and Zosyn which was switched to Zosyn.  Her blood cultures have remained negative.  She has been evaluated by speech therapy and her diet has been changed to puréed diet level for with mild thick liquid.  Her group home is able to take patient home as she is assisted of two and also they are considering talking to hospice with PCP per discussion with guardian and group home.     Septic shock likely secondary to pneumonia  -Patient was in the ICU initially required Levophed.  Stable he was treated with IV vancomycin and Zosyn currently on Zosyn alone.  She has been discharged on oral Augmentin     Pneumonia presumed gram-negative  -Currently on IV  Zosyn as above suspect is a significant  component of aspiration.   -Has been discharged on oral Augmentin  -Speech therapy consulted see diet recommendations above    Acute encephalopathy: Likely septic encephalopathy  -Patient has history of Down syndrome and dementia appears to be comfortable stable.     Acute hypoxic respiratory failure secondary to pneumonia  -On room air     Down syndrome with dementia and seizure disorder living at group home  -Nonverbal continue her antiepileptic regimen of Keppra  -As above guardian to discuss with PCP for possible hospice    Hypernatremia  -Improved with IV fluid     Incidental findings  this admission that need follow up with PCP :  Cholelithiasis  Kidney stone            Code Status:      DNR / DNI         Important Results:                  Pending Results:        Unresulted Labs Ordered in the Past 30 Days of this Admission     Date and Time Order Name Status Description    9/16/2021 12:44 PM Blood Culture Line, venous Preliminary                Discharge Instructions and Follow-Up:      Follow-up Appointments     Follow-up and recommended labs and tests       Follow up with primary care provider, Iwona Delgado, within 7 days for   hospital follow- up.  No follow up labs or test are needed.                  Discharge Disposition:        Discharged to home          Discharge Medications:        Current Discharge Medication List      START taking these medications    Details   amoxicillin-clavulanate (AUGMENTIN) 875-125 MG tablet Take 1 tablet by mouth every 12 hours for 2 days  Qty: 4 tablet, Refills: 0    Associated Diagnoses: Aspiration pneumonitis (H)      pantoprazole (PROTONIX) 40 MG EC tablet Take 1 tablet (40 mg) by mouth every morning (before breakfast)  Qty: 30 tablet, Refills: 0    Associated Diagnoses: Aspiration pneumonitis (H)         CONTINUE these medications which have NOT CHANGED    Details   benzonatate (TESSALON) 100 MG capsule Take 100 mg by mouth 3 times daily as needed for cough     "  calcium carbonate (TUMS) 500 MG chewable tablet Take 1 chew tab by mouth daily      cetirizine (ZYRTEC) 5 MG/5ML solution Take 10 mLs by mouth daily      diphenhydrAMINE (BENADRYL) 25 MG capsule Take 25 mg by mouth nightly as needed for allergies      !! divalproex sodium delayed-release (DEPAKOTE SPRINKLE) 125 MG DR capsule Take 250 mg by mouth every morning       escitalopram (LEXAPRO) 5 MG tablet Take 5 mg by mouth daily      levETIRAcetam (KEPPRA) 250 MG tablet Take 2 tablets (500 mg) by mouth 2 times daily  Qty: 120 tablet, Refills: 11    Associated Diagnoses: Seizure disorder (H)      LORazepam (ATIVAN) 0.5 MG tablet Take 0.5-1 mg by mouth as needed (Prior to CT or other procedure)       miconazole (MICATIN) 2 % external powder Apply topically once a week Saturdays - to socks      !! divalproex sodium delayed-release (DEPAKOTE SPRINKLE) 125 MG DR capsule Take 500 mg by mouth At Bedtime      melatonin 3 MG tablet Take 3 mg by mouth At Bedtime       !! - Potential duplicate medications found. Please discuss with provider.               Allergies:       No Known Allergies         Consultations This Hospital Stay:        No consultations were requested during this admission          Condition and Physical Exam on Discharge:        Discharge condition: Stable   Discharge vitals: Blood pressure 116/55, pulse 66, temperature 97.1  F (36.2  C), temperature source Axillary, resp. rate 14, height 1.321 m (4' 4\"), weight 38 kg (83 lb 12.7 oz), SpO2 95 %.   General: Pt in NAD, normal appearance  HEENT: OP clear MMM, no JVD  Lungs: Clear to Auscultation Bilateral, normal breathing  without accessory muscle usage, no wheezing, rhonchi or crackles  Cardiac: +S1, S2, RRR, no MRG, no edema  Abdominal: normal bowel sounds, NT/ND, no hepatosplenomegaly  Skin: warm, dry, normal turgor, no rash  Psyche: A& O x3, appropriate affect            Discharge Orders for Skilled Facility (from Discharge Orders):        After Care " Instructions     Activity      Your activity upon discharge: activity as tolerated         Diet      Follow this diet upon discharge: Orders Placed This Encounter      Pureed Diet (level 4) Mildly Thick (level 2) (tsp only, 1:1 assist)  Encourage oral intake.                    Rehab orders for Skilled Facility (from Discharge Orders):      Referrals     Future Labs/Procedures    Home Care SLP Referral for Hospital Discharge     Comments:    SLP to eval and treat    Your provider has ordered home care - speech therapy. If you have not been   contacted within 2 days of your discharge please call the department phone   number listed on the top of this document.             Discharge Time:      Greater than 30 minutes.        Image Results From This Hospital Stay (For Non-EPIC Providers):        Results for orders placed or performed during the hospital encounter of 09/16/21   XR Chest Port 1 View    Narrative    EXAM: XR CHEST PORT 1 VIEW  LOCATION: [facility]  DATE/TIME: 9/16/2021 2:27 PM    INDICATION: cough, fever, hypoxia  COMPARISON: Chest x-ray 07/01/2021      Impression    IMPRESSION: The patient is rotated towards the right. Low lung volumes with accentuation of the cardiac silhouette. The lungs are clear and there are no pleural effusions. Stable upper normal heart size.   CT Abdomen Pelvis w Contrast    Narrative    EXAM: CT ABDOMEN PELVIS W CONTRAST  LOCATION: [facility]  DATE/TIME: 9/16/2021 2:48 PM    INDICATION: Fever and vomiting in a nonverbal patient with Down syndrome and dementia.  COMPARISON: CT [facility] 11/18/2020.  TECHNIQUE: CT scan of the abdomen and pelvis was performed following injection of IV contrast. Multiplanar reformats were obtained. Dose reduction techniques were used.  CONTRAST: Isovue-370, 79 mL IV.    FINDINGS:   LOWER CHEST: Bronchial thickening and endobronchial secretions as well as peribronchial groundglass density opacities,  interlobular septal thickening and small scattered foci of consolidation throughout the visualized lower lungs. No pleural effusion.   Heart size normal with trace pericardial effusion.     HEPATOBILIARY: Liver is normal. No bile duct dilatation. Cholelithiasis. Gallbladder otherwise unremarkable.    PANCREAS: Normal.    SPLEEN: Spleen size normal.    ADRENAL GLANDS: Normal.    KIDNEY/BLADDER: Nonobstructing 4 mm stone lower pole right kidney unchanged. Benign cyst upper pole left kidney unchanged and requires no followup. Kidneys, ureters and bladder are otherwise normal.    BOWEL: Appendix appears to be within normal limits with caveat the bowel is not well evaluated due to patient motion artifact. Large amount of stool in the distal sigmoid colon and rectum. No free air or free fluid.    LYMPH NODES: No lymphadenopathy.    VASCULATURE: Normal caliber abdominal aorta.      PELVIC ORGANS: No pelvic mass or fluid.    MUSCULOSKELETAL: Thoracolumbar scoliosis. Chronic bilateral pars defects at L5 with 8 mm anterolisthesis of L5 on S1. No fracture or bone lesion identified.      Impression    IMPRESSION:   1.  Bronchial thickening and endobronchial secretions as well as peribronchial groundglass density opacities, interlobular septal thickening and small scattered foci of consolidation throughout the visualized lower lungs. Findings compatible with   bronchial inflammation/infection with probable developing pneumonia.   2.  Appendix appears to be within normal limits with caveat the bowel is not well evaluated patient motion artifact.  3.  Large amount of stool in the distal sigmoid colon and rectum.  4.  Cholelithiasis.  5.  Nonobstructing 4 mm stone lower pole right kidney unchanged.           Most Recent Lab Results In EPIC (For Non-EPIC Providers):    Most Recent 3 CBC's:  Recent Labs   Lab Test 09/19/21  0633 09/18/21  0532 09/17/21  1700 09/16/21  1322 09/16/21  1322   WBC  --  7.7 9.6  --  9.4   HGB  --  9.2*  9.6*  --  13.4   MCV  --  106* 107*  --  106*    130* 125*   < > 190    < > = values in this interval not displayed.      Most Recent 3 BMP's:  Recent Labs   Lab Test 09/20/21  0507 09/20/21  0204 09/19/21  2140 09/19/21  0953 09/19/21  0851 09/19/21  0633 09/19/21  0421 09/19/21  0149 09/18/21  0636 09/18/21  0532 09/18/21  0532 09/17/21  0221 09/16/21  1322   NA  --   --   --   --  144  --   --   --   --   --  146*  --  141   POTASSIUM 3.2*  --   --   --  3.4  --   --  3.6   < >  --  3.0*  --  4.1   CHLORIDE  --   --   --   --  114*  --   --   --   --   --  115*  --  104   CO2  --   --   --   --  27  --   --   --   --   --  25  --  26   BUN  --   --   --   --  7  --   --   --   --   --  12  --  22   CR 0.68  --   --   --  0.68 0.64  --   --    < >  --  0.62   < > 0.83   ANIONGAP  --   --   --   --  3  --   --   --   --   --  6  --  11   VICENTE  --   --   --   --  7.4*  --   --   --   --   --  7.1*  --  9.1   GLC 93 76 79   < > 89  --    < >  --    < >   < > 98   < > 97    < > = values in this interval not displayed.     Most Recent 3 Troponin's:No lab results found.  Most Recent 3 INR's:  Recent Labs   Lab Test 11/09/20 0915 11/08/20  0613 11/07/20  1112   INR 1.33* 1.35* 1.22*     Most Recent 2 LFT's:  Recent Labs   Lab Test 09/18/21  0532 09/16/21  1322   AST 24 40   ALT 22 32   ALKPHOS 49 87   BILITOTAL 1.1 0.5     Most Recent Cholesterol Panel:  Recent Labs   Lab Test 09/11/17  0936 08/14/14  1121 08/14/14  1121   CHOL 204*   < > 188   LDL  --   --  115    < > = values in this interval not displayed.     Most Recent 6 Bacteria Isolates From Any Culture (See EPIC Reports for Culture Details):  Recent Labs   Lab Test 03/20/16  0309 03/20/16  0250 03/20/16  0205   CULT No growth after 6 days >100,000 colonies/mL Mixed gram positive leah No growth after 6 days     Most Recent TSH, T4 and HgbA1c:  Recent Labs   Lab Test 05/13/21  1209   TSH 2.38

## 2021-09-20 NOTE — PROGRESS NOTES
Non-verbal, assist of two with repositioning/change, puree diet with level two liquids.+ve bowel sounds, incontinence of urine changed x2, pure wick placed. Total care with changing and feeding.Med crushed with apple sauce.Monitoring blood sugars, 79,76 snack given with sips of orange juice. Am BS 93, Potassium 3.2 order placed for replacement recheck in 4hrs. Plan to discharge back to group home possibly today pending blood sugar stability.

## 2021-09-20 NOTE — PLAN OF CARE
Vitals: Temp: 97  F (36.1  C) Temp src: Axillary BP: 116/55 Pulse: 68   Resp: 16 SpO2: 94 % O2 Device: None (Room air)    Pain: 0 on PAINAD score. Pt appears comfortable  Neuro: TIERA, mute, slow and inconsistent to follow directions.   Respiratory: LS dim, breathing unlabored.  Cardiac/tele: Tele SR/SB  GI/: Incontinent of bowel and bladder, purewick in place  Skin: WDL  LDAs: PICC to right, PIV to left; all SL  Labs: K 3.2, replaced, recheck scheduled.   Diet: Pureed, thickened liquids. Total feed. Poor appetite, ate <25% of meals. Intake encouraged.   Activity: A2 with lift, staff attempted to sit pt on edge of bed and have pt stand to assess mobility, pt was unable to stand with 2 staff members and gaitbelt. Care coordinator and MD aware.  Plan: Pt to discharge home today back to [facility] for possible hospice eval.     Writer called and gave report to HAMILTON Morales from [facility]. All questions answered, denies any further questions or concerns. PIV and PICC removed, no complications. Telemetry monitor removed. All belongings returned. Pt escorted to front by [facility] wheelchair transport.

## 2021-09-20 NOTE — DISCHARGE INSTRUCTIONS
Your home care referral was sent to [facility]  If you haven't heard from them within the next 24-48 hours,  Please call them at [phone]

## 2021-09-20 NOTE — CONSULTS
Care Management Initial Consult    General Information  Assessment completed with: Care Team Member, Other, yanira gallegos and guardian jeramie  Type of CM/SW Visit: Initial Assessment    Primary Care Provider verified and updated as needed: Yes   Readmission within the last 30 days: no previous admission in last 30 days      Reason for Consult: care coordination/care conference, discharge planning      Communication Assessment  Patient's communication style: spoken language (English or Bilingual)    Hearing Difficulty or Deaf: no   Wear Glasses or Blind: other (see comments) (TIERA)    Cognitive  Cognitive/Neuro/Behavioral: .WDL except  Level of Consciousness: alert  Arousal Level: opens eyes spontaneously  Orientation: other (see comments) (TIERA)  Mood/Behavior: calm  Best Language: 3 - Mute  Speech: unable to speak    Living Environment:   People in home: other (see comments) ([facility] )     Current living Arrangements: group home      Able to return to prior arrangements: yes       Family/Social Support:  Care provided by: other (see comments)  Provides care for: no one, unable/limited ability to care for self     Facility resident(s)/Staff          Description of Support System: Supportive         Current Resources:   Patient receiving home care services: No     Community Resources: None  Equipment currently used at home: none  Supplies currently used at home: None    Employment/Financial:    Financial Concerns: No concerns identified           Lifestyle & Psychosocial Needs:  Social Determinants of Health     Tobacco Use: Low Risk      Smoking Tobacco Use: Never Smoker     Smokeless Tobacco Use: Never Used   Alcohol Use:      Frequency of Alcohol Consumption:      Average Number of Drinks:      Frequency of Binge Drinking:    Financial Resource Strain:      Difficulty of Paying Living Expenses:    Food Insecurity:      Worried About Running Out of Food in the Last Year:      Ran Out of Food in the Last Year:    Transportation  Needs:      Lack of Transportation (Medical):      Lack of Transportation (Non-Medical):    Physical Activity:      Days of Exercise per Week:      Minutes of Exercise per Session:    Stress:      Feeling of Stress :    Social Connections:      Frequency of Communication with Friends and Family:      Frequency of Social Gatherings with Friends and Family:      Attends Orthodoxy Services:      Active Member of Clubs or Organizations:      Attends Club or Organization Meetings:      Marital Status:    Intimate Partner Violence:      Fear of Current or Ex-Partner:      Emotionally Abused:      Physically Abused:      Sexually Abused:    Depression:      PHQ-2 Score:    Housing Stability:      Unable to Pay for Housing in the Last Year:      Number of Places Lived in the Last Year:      Unstable Housing in the Last Year:        Functional Status:  Prior to admission patient needed assistance:    yes          Mental Health Status:  Mental Health Status: No Current Concerns       Chemical Dependency Status:  Chemical Dependency Status: No Current Concerns           Additional Information:  Pt is from CECY Morales RN phone: [phone] fax: [phone] was contacted.  Pt is an assist of 1 at baseline. They use a transfer belt and pivot her. They would need her to be an assist of one to return.  They are aware of current diet recommendations and can accommodate Pureed Diet (level 4) Mildly Thick (level 2) (tsp only, 1:1 assist)   Encourage oral intake: Pt has had home care SLP in the past.  They have used [facility].  Any new medications need to be filled at [facility].  I also talked with Pt[facility] [phone] cell: [phone].  She is agreeable to home care.  She is agreeable that I send it to [facility] care.  [facility] transport set up for today at 1545.  [facility] and Guardian aware.    11:55 Guardian and [facility] said they will take her back even with being an assist  of two.  She is 80 lbs. They will talk about hospice with her primary care provider per guardian request.  Will cancel PT consult per request of guardian and goals of care.      12:25 Guardian said they still want SLP consult.  They will go through primary care for hospice.  She declined for me to set up hospice info meeting . She said they will go through primary for hospital bed and amrik.       Maty Myles RN BSN   Inpatient Care Coordination  [facility]  [phone]      Emilia Myles, RN